# Patient Record
Sex: MALE | Race: AMERICAN INDIAN OR ALASKA NATIVE | NOT HISPANIC OR LATINO | URBAN - METROPOLITAN AREA
[De-identification: names, ages, dates, MRNs, and addresses within clinical notes are randomized per-mention and may not be internally consistent; named-entity substitution may affect disease eponyms.]

---

## 2017-10-03 ENCOUNTER — OUTPATIENT (OUTPATIENT)
Dept: OUTPATIENT SERVICES | Facility: HOSPITAL | Age: 37
LOS: 1 days | Discharge: HOME | End: 2017-10-03

## 2017-10-03 DIAGNOSIS — G47.33 OBSTRUCTIVE SLEEP APNEA (ADULT) (PEDIATRIC): ICD-10-CM

## 2017-10-03 DIAGNOSIS — F17.200 NICOTINE DEPENDENCE, UNSPECIFIED, UNCOMPLICATED: ICD-10-CM

## 2018-05-23 ENCOUNTER — EMERGENCY (EMERGENCY)
Facility: HOSPITAL | Age: 38
LOS: 0 days | Discharge: HOME | End: 2018-05-23
Admitting: PHYSICIAN ASSISTANT

## 2018-05-23 VITALS
HEART RATE: 104 BPM | DIASTOLIC BLOOD PRESSURE: 74 MMHG | TEMPERATURE: 100 F | RESPIRATION RATE: 20 BRPM | SYSTOLIC BLOOD PRESSURE: 118 MMHG | OXYGEN SATURATION: 99 %

## 2018-05-23 VITALS
DIASTOLIC BLOOD PRESSURE: 78 MMHG | OXYGEN SATURATION: 99 % | HEART RATE: 113 BPM | RESPIRATION RATE: 20 BRPM | TEMPERATURE: 100 F | SYSTOLIC BLOOD PRESSURE: 157 MMHG

## 2018-05-23 DIAGNOSIS — R50.9 FEVER, UNSPECIFIED: ICD-10-CM

## 2018-05-23 DIAGNOSIS — L02.31 CUTANEOUS ABSCESS OF BUTTOCK: ICD-10-CM

## 2018-05-23 RX ORDER — CEPHALEXIN 500 MG
1 CAPSULE ORAL
Qty: 28 | Refills: 0 | OUTPATIENT
Start: 2018-05-23 | End: 2018-05-29

## 2018-05-23 RX ORDER — CEPHALEXIN 500 MG
500 CAPSULE ORAL
Qty: 0 | Refills: 0 | Status: DISCONTINUED | OUTPATIENT
Start: 2018-05-23 | End: 2018-05-23

## 2018-05-23 RX ORDER — IBUPROFEN 200 MG
600 TABLET ORAL ONCE
Qty: 0 | Refills: 0 | Status: COMPLETED | OUTPATIENT
Start: 2018-05-23 | End: 2018-05-23

## 2018-05-23 RX ORDER — AZTREONAM 2 G
1 VIAL (EA) INJECTION
Qty: 14 | Refills: 0 | OUTPATIENT
Start: 2018-05-23 | End: 2018-05-29

## 2018-05-23 RX ADMIN — Medication 500 MILLIGRAM(S): at 22:02

## 2018-05-23 RX ADMIN — Medication 600 MILLIGRAM(S): at 21:40

## 2018-05-23 RX ADMIN — Medication 1 TABLET(S): at 22:02

## 2018-05-23 NOTE — ED PROVIDER NOTE - OBJECTIVE STATEMENT
36 yo male with no significant pmh presents to the ED c/o left buttock abscess x 2/3 days. Associated with fever and chills x 1 day. No prior abscess. No drainage. Pain worse with sitting. Denies abdominal pain, N/V/D.

## 2018-05-23 NOTE — ED PROVIDER NOTE - MEDICAL DECISION MAKING DETAILS
Patient with buttock abscess, I+D done with significant drainage, + low grade fever in ED. Patient started in abx and instructed to return in 1-2 days for wound check

## 2018-05-23 NOTE — ED PROVIDER NOTE - PHYSICAL EXAMINATION
GENERAL:  well appearing, non-toxic patient in no acute distress  SKIN: skin warm, pink and dry. MMM. + left sided buttock abscess, + central fluctuance with spontaneous purulent drainage, + surrounding erythema and increased warmth  PULM: CTAB. Normal respiratory effort. No respiratory distress. No wheezes, stridor, rales or rhonchi. No retractions  CV: RRR, no M/R/G.   ABD: Soft, non-tender, non-distended. No rebound or guarding. No CVA tenderness.  rectal: + left buttock abscess - see skin exam, no perirectal tenderness, no tenderness or fluctuance on digital exam,  NEURO: A+Ox3, no sensory/motor deficits

## 2018-05-23 NOTE — ED PROVIDER NOTE - NS ED ROS FT
Constitutional: +fever and chills  Cardiovascular: no chest pain, no sob  Respiratory: no cough, no shortness of breath  Gastrointestinal: no nausea, vomiting or diarrhea. no abdominal pain.  Integumentary: See HPI  Neurological: no headache, no dizziness, no visual changes, no UE/LE weakness or paresthesias.

## 2018-05-24 ENCOUNTER — EMERGENCY (EMERGENCY)
Facility: HOSPITAL | Age: 38
LOS: 0 days | Discharge: HOME | End: 2018-05-24
Admitting: PHYSICIAN ASSISTANT

## 2018-05-24 VITALS
RESPIRATION RATE: 18 BRPM | HEART RATE: 100 BPM | TEMPERATURE: 98 F | DIASTOLIC BLOOD PRESSURE: 70 MMHG | SYSTOLIC BLOOD PRESSURE: 142 MMHG | OXYGEN SATURATION: 99 %

## 2018-05-24 DIAGNOSIS — Z48.89 ENCOUNTER FOR OTHER SPECIFIED SURGICAL AFTERCARE: ICD-10-CM

## 2018-05-24 DIAGNOSIS — L02.31 CUTANEOUS ABSCESS OF BUTTOCK: ICD-10-CM

## 2018-05-24 DIAGNOSIS — Z79.2 LONG TERM (CURRENT) USE OF ANTIBIOTICS: ICD-10-CM

## 2018-05-24 RX ORDER — CEPHALEXIN 500 MG
1 CAPSULE ORAL
Qty: 28 | Refills: 0
Start: 2018-05-24 | End: 2018-05-30

## 2018-05-24 RX ORDER — AZTREONAM 2 G
1 VIAL (EA) INJECTION
Qty: 14 | Refills: 0
Start: 2018-05-24 | End: 2018-05-30

## 2018-05-24 NOTE — ED PROVIDER NOTE - OBJECTIVE STATEMENT
36 yo no sig hx present c/o wound check s/p I&D to left buttock abscess last night. patient was instructed to return in 24 hours. reports he had the abscess for about 3 days and I&D performed last night. pain improved after I&D. reports he is taking abx. denies fever/chill/worsening pain and swelling

## 2018-05-24 NOTE — ED PROVIDER NOTE - SKIN, MLM
a healing abscess noted to left buttock next to the anal region. no extension of swelling into the anus. no purulent drainage and surrounding erythema/swelling and tenderness.

## 2018-12-20 ENCOUNTER — EMERGENCY (EMERGENCY)
Facility: HOSPITAL | Age: 38
LOS: 0 days | Discharge: HOME | End: 2018-12-21
Attending: EMERGENCY MEDICINE | Admitting: EMERGENCY MEDICINE

## 2018-12-20 VITALS
DIASTOLIC BLOOD PRESSURE: 80 MMHG | OXYGEN SATURATION: 99 % | RESPIRATION RATE: 20 BRPM | HEART RATE: 63 BPM | TEMPERATURE: 98 F | SYSTOLIC BLOOD PRESSURE: 133 MMHG

## 2018-12-20 DIAGNOSIS — E78.5 HYPERLIPIDEMIA, UNSPECIFIED: ICD-10-CM

## 2018-12-20 DIAGNOSIS — R07.9 CHEST PAIN, UNSPECIFIED: ICD-10-CM

## 2018-12-20 DIAGNOSIS — F17.210 NICOTINE DEPENDENCE, CIGARETTES, UNCOMPLICATED: ICD-10-CM

## 2018-12-20 LAB
ALBUMIN SERPL ELPH-MCNC: 4.8 G/DL — SIGNIFICANT CHANGE UP (ref 3.5–5.2)
ALP SERPL-CCNC: 78 U/L — SIGNIFICANT CHANGE UP (ref 30–115)
ALT FLD-CCNC: 50 U/L — HIGH (ref 0–41)
ANION GAP SERPL CALC-SCNC: 16 MMOL/L — HIGH (ref 7–14)
AST SERPL-CCNC: 21 U/L — SIGNIFICANT CHANGE UP (ref 0–41)
BILIRUB SERPL-MCNC: 0.3 MG/DL — SIGNIFICANT CHANGE UP (ref 0.2–1.2)
BUN SERPL-MCNC: 12 MG/DL — SIGNIFICANT CHANGE UP (ref 10–20)
CALCIUM SERPL-MCNC: 9.3 MG/DL — SIGNIFICANT CHANGE UP (ref 8.5–10.1)
CHLORIDE SERPL-SCNC: 99 MMOL/L — SIGNIFICANT CHANGE UP (ref 98–110)
CO2 SERPL-SCNC: 25 MMOL/L — SIGNIFICANT CHANGE UP (ref 17–32)
CREAT SERPL-MCNC: 1 MG/DL — SIGNIFICANT CHANGE UP (ref 0.7–1.5)
GLUCOSE SERPL-MCNC: 90 MG/DL — SIGNIFICANT CHANGE UP (ref 70–99)
HCT VFR BLD CALC: 47.1 % — SIGNIFICANT CHANGE UP (ref 42–52)
HGB BLD-MCNC: 16.4 G/DL — SIGNIFICANT CHANGE UP (ref 14–18)
MCHC RBC-ENTMCNC: 31.1 PG — HIGH (ref 27–31)
MCHC RBC-ENTMCNC: 34.8 G/DL — SIGNIFICANT CHANGE UP (ref 32–37)
MCV RBC AUTO: 89.4 FL — SIGNIFICANT CHANGE UP (ref 80–94)
NRBC # BLD: 0 /100 WBCS — SIGNIFICANT CHANGE UP (ref 0–0)
PLATELET # BLD AUTO: 289 K/UL — SIGNIFICANT CHANGE UP (ref 130–400)
POTASSIUM SERPL-MCNC: 3.9 MMOL/L — SIGNIFICANT CHANGE UP (ref 3.5–5)
POTASSIUM SERPL-SCNC: 3.9 MMOL/L — SIGNIFICANT CHANGE UP (ref 3.5–5)
PROT SERPL-MCNC: 7.1 G/DL — SIGNIFICANT CHANGE UP (ref 6–8)
RBC # BLD: 5.27 M/UL — SIGNIFICANT CHANGE UP (ref 4.7–6.1)
RBC # FLD: 11.8 % — SIGNIFICANT CHANGE UP (ref 11.5–14.5)
SODIUM SERPL-SCNC: 140 MMOL/L — SIGNIFICANT CHANGE UP (ref 135–146)
TROPONIN T SERPL-MCNC: <0.01 NG/ML — SIGNIFICANT CHANGE UP
WBC # BLD: 11.22 K/UL — HIGH (ref 4.8–10.8)
WBC # FLD AUTO: 11.22 K/UL — HIGH (ref 4.8–10.8)

## 2018-12-20 NOTE — ED PROVIDER NOTE - NS ED ROS FT
Constitutional: No fever, chills.  Eyes: No visual changes.  ENT: No hearing changes.  Neck: No neck pain or stiffness.  Cardiovascular: see hpi.  Pulmonary: No SOB, cough. No hemoptysis.  Abdominal:  No nausea, vomiting, diarrhea.  : No dysuria, frequency.  Neuro: No headache, syncope, dizziness.  MS: No back pain. No calf pain/swelling.  Psych: No suicidal ideations.

## 2018-12-20 NOTE — ED PROVIDER NOTE - ATTENDING CONTRIBUTION TO CARE
38 y.o. male comes in c/o chest pain for 4 days, intermittent, sometimes worse with movement. No SOB. No fever/chills, n/v/c/d, diaphoresis, leg pain/swelling. No travel. No family h/o heart dz. Went to [facility] today, was found to have TWI in inferior leads and was sent to ED. On exam, pt in NAD, AAOx3, head NC/AT, CN II-XII intact, lungs CTA B/L, chest (-) pain on palpation, CV S1S2 regular, abdomen soft/NT/ND/(+)BS, ext (-) edema, motor 5/5x4, sensation intact. Will do labs, CXR, EKG and reevaluate.

## 2018-12-20 NOTE — ED PROVIDER NOTE - OBJECTIVE STATEMENT
38 yold male to Ed Pmhx Hld currently on no meds c/o chest pain described as sharp mid sternal worse with sitting forward x 4 days; pt taking tylenol and naprosen for pain; + smoker; denies crack, cocaine, fmhx cad, DM,

## 2018-12-20 NOTE — ED ADULT NURSE NOTE - OBJECTIVE STATEMENT
Pt BIBA c/o mid-sternal chest pain non-radiating x 4 days, sent in by PCP for ST depressions. Denies n/v/d, denies fevers/chills.

## 2018-12-20 NOTE — ED ADULT NURSE NOTE - NSIMPLEMENTINTERV_GEN_ALL_ED
Implemented All Universal Safety Interventions:  [place] to call system. Call bell, personal items and telephone within reach. Instruct patient to call for assistance. Room bathroom lighting operational. Non-slip footwear when patient is off stretcher. Physically safe environment: no spills, clutter or unnecessary equipment. Stretcher in lowest position, wheels locked, appropriate side rails in place.

## 2018-12-20 NOTE — ED PROVIDER NOTE - MEDICAL DECISION MAKING DETAILS
38 y.o. male comes in c/o chest pain for 4 days, intermittent, sometimes worse with movement. No SOB. No fever/chills, n/v/c/d, diaphoresis, leg pain/swelling. No travel. No family h/o heart dz. Went to [facility] [place] [place] today, was found to have TWI in inferior leads and was sent to ED. On exam, pt in NAD, AAOx3, head NC/AT, CN II-XII intact, lungs CTA B/L, chest (-) pain on palpation, CV S1S2 regular, abdomen soft/NT/ND/(+)BS, ext (-) edema, motor 5/5x4, sensation intact. Will transfer pt to obs.

## 2018-12-20 NOTE — ED PROVIDER NOTE - PHYSICAL EXAMINATION
Constitutional: Well developed, well nourished. NAD  Head: Normocephalic, atraumatic.  Eyes: PERRL, EOMI.  ENT: No nasal discharge. Mucous membranes dry.  Neck: Supple. Painless ROM.  Cardiovascular:  Regular rate and rhythm.  Pulmonary:  Lungs clear to auscultation bilaterally.   Abdominal: Soft. Nondistended. No rebound, guarding, rigidity.  Extremities. Pelvis stable. No lower extremity edema, symmetric calves.  Skin: No rashes, cyanosis.  Neuro: AAOx3. No focal neurological deficits.  Psych: Normal mood. Normal affect.

## 2018-12-21 VITALS
OXYGEN SATURATION: 99 % | DIASTOLIC BLOOD PRESSURE: 60 MMHG | RESPIRATION RATE: 16 BRPM | HEART RATE: 76 BPM | SYSTOLIC BLOOD PRESSURE: 127 MMHG

## 2018-12-21 LAB — TROPONIN T SERPL-MCNC: <0.01 NG/ML — SIGNIFICANT CHANGE UP

## 2018-12-21 RX ORDER — KETOROLAC TROMETHAMINE 30 MG/ML
15 SYRINGE (ML) INJECTION ONCE
Qty: 0 | Refills: 0 | Status: DISCONTINUED | OUTPATIENT
Start: 2018-12-21 | End: 2018-12-21

## 2018-12-21 RX ADMIN — Medication 15 MILLIGRAM(S): at 12:11

## 2018-12-21 RX ADMIN — Medication 15 MILLIGRAM(S): at 09:46

## 2018-12-21 NOTE — ED CDU PROVIDER DISPOSITION NOTE - CARE PROVIDER_API CALL
Raimundo Colbert), Cardiovascular Disease; Internal Medicine  [address] [place] [address]  Phone: [phone]  Fax: [phone]

## 2018-12-21 NOTE — ED ADULT NURSE REASSESSMENT NOTE - NS ED NURSE REASSESS COMMENT FT1
Patient report received from previous RN, patient at this time is at ECHO for further studies, will wait for patient to return to ED for assessment.

## 2018-12-21 NOTE — ED CDU PROVIDER DISPOSITION NOTE - ATTENDING CONTRIBUTION TO CARE
39yo smoker with h/o HLD was placed in CDU for workup of chest pain, positional, worse with movement, deep breaths, constant x several days. No fever/chills. Pt was eval in the ED with EKG, which was suggestive of pericarditis. Pt was monitored in CDU without incident and remained hemodynamically stable. Pain persisted but improved dramatically with NSAIDS. On my eval pt nontoxic appearing, lungs CTA, CVS1S2 RRR abd soft, no peripheral edema. Pain is reproducible with coughing, and moving in bed. Repeat EKG and enzymes unchanged. Echo was normal, with no pericardial effusion. Doubt CAD, much more likely viral pericarditis; rec NSAIDS, f/u with cardiology and return to the ED for persistent or worsening sx.

## 2018-12-21 NOTE — ED CDU PROVIDER DISPOSITION NOTE - CLINICAL COURSE
37yo smoker with h/o HLD was placed in CDU for workup of chest pain, positional, worse with movement, deep breaths, constant x several days. No fever/chills. Pt was eval in the ED with EKG, which was suggestive of pericarditis. Pt was monitored in CDU without incident and remained hemodynamically stable. Pain persisted but improved dramatically with NSAIDS. On my eval pt nontoxic appearing, lungs CTA, CVS1S2 RRR abd soft, no peripheral edema. Pain is reproducible with coughing, and moving in bed. Repeat EKG and enzymes unchanged. Echo was normal, with no pericardial effusion. Doubt CAD, much more likely viral pericarditis; rec NSAIDS, f/u with cardiology and return to the ED for persistent or worsening sx.

## 2018-12-21 NOTE — ED CDU PROVIDER INITIAL DAY NOTE - PROGRESS NOTE DETAILS
Pt seen bedside NAD, resting comfortably, pt still in pain given toradol. PT went for echo and waiting on pending results, had negative cardiac enzymes x2 and slightly abnormal ekg.

## 2018-12-21 NOTE — ED CDU PROVIDER INITIAL DAY NOTE - MEDICAL DECISION MAKING DETAILS
39yo smoker with h/o HLD was placed in CDU for workup of chest pain after unremarkable ED eval. VS, exam as noted, pt comfortable on my evaluation. Plan for serial enzymes, EKG, CCTA. 39yo smoker with h/o HLD was placed in CDU for workup of chest pain, positional, worse with movement, deep breaths, constant x several days. No fever/chills. Pt was eval in the ED with EKG, which was suggestive of pericarditis, and labs with cardiac enzymes which were normal. Plan is for echo, serial enzymes, pain control, reassess.

## 2018-12-21 NOTE — ED ADULT NURSE REASSESSMENT NOTE - NS ED NURSE REASSESS COMMENT FT1
Patient returned to ED from echo, patient is alert and awake and oriented x4, no acute distress expressed from patient, currently NSR, VSS, will continue to watch and assess patient, safety and comfort measures maintained.

## 2018-12-21 NOTE — ED CDU PROVIDER INITIAL DAY NOTE - CARDIAC, MLM
Normal rate, regular rhythm.  Heart sounds S1, S2.  No murmurs, rubs or gallops. + ttp over sternal area and right chest wall.

## 2018-12-21 NOTE — ED CDU PROVIDER INITIAL DAY NOTE - OBJECTIVE STATEMENT
37y/o male with pmh of hld-pt. states during last check up his bad cholesterol was elevated but his doctor never put him on hld meds, pt. c/o diffuse cp x 4 day6s. cp is positional. denies heavy lifting, sob, fever, cough, abdominal pain, vomiting. + smoker, no drug use. no family hx of cad.

## 2019-11-07 ENCOUNTER — EMERGENCY (EMERGENCY)
Facility: HOSPITAL | Age: 39
LOS: 0 days | Discharge: HOME | End: 2019-11-07
Attending: EMERGENCY MEDICINE | Admitting: EMERGENCY MEDICINE
Payer: MEDICAID

## 2019-11-07 VITALS
OXYGEN SATURATION: 99 % | HEART RATE: 78 BPM | DIASTOLIC BLOOD PRESSURE: 71 MMHG | RESPIRATION RATE: 20 BRPM | TEMPERATURE: 97 F | SYSTOLIC BLOOD PRESSURE: 138 MMHG

## 2019-11-07 VITALS
OXYGEN SATURATION: 99 % | RESPIRATION RATE: 18 BRPM | SYSTOLIC BLOOD PRESSURE: 147 MMHG | DIASTOLIC BLOOD PRESSURE: 79 MMHG | HEART RATE: 79 BPM | TEMPERATURE: 96 F

## 2019-11-07 DIAGNOSIS — R10.9 UNSPECIFIED ABDOMINAL PAIN: ICD-10-CM

## 2019-11-07 DIAGNOSIS — N20.0 CALCULUS OF KIDNEY: ICD-10-CM

## 2019-11-07 PROBLEM — E78.00 PURE HYPERCHOLESTEROLEMIA, UNSPECIFIED: Chronic | Status: ACTIVE | Noted: 2018-12-20

## 2019-11-07 LAB
ALBUMIN SERPL ELPH-MCNC: 4.6 G/DL — SIGNIFICANT CHANGE UP (ref 3.5–5.2)
ALP SERPL-CCNC: 95 U/L — SIGNIFICANT CHANGE UP (ref 30–115)
ALT FLD-CCNC: 43 U/L — HIGH (ref 0–41)
ANION GAP SERPL CALC-SCNC: 13 MMOL/L — SIGNIFICANT CHANGE UP (ref 7–14)
APPEARANCE UR: CLEAR — SIGNIFICANT CHANGE UP
APTT BLD: 31.3 SEC — SIGNIFICANT CHANGE UP (ref 27–39.2)
AST SERPL-CCNC: 21 U/L — SIGNIFICANT CHANGE UP (ref 0–41)
BACTERIA # UR AUTO: NEGATIVE — SIGNIFICANT CHANGE UP
BASOPHILS # BLD AUTO: 0.05 K/UL — SIGNIFICANT CHANGE UP (ref 0–0.2)
BASOPHILS NFR BLD AUTO: 0.4 % — SIGNIFICANT CHANGE UP (ref 0–1)
BILIRUB SERPL-MCNC: 0.3 MG/DL — SIGNIFICANT CHANGE UP (ref 0.2–1.2)
BILIRUB UR-MCNC: NEGATIVE — SIGNIFICANT CHANGE UP
BLD GP AB SCN SERPL QL: SIGNIFICANT CHANGE UP
BUN SERPL-MCNC: 15 MG/DL — SIGNIFICANT CHANGE UP (ref 10–20)
CALCIUM SERPL-MCNC: 9.2 MG/DL — SIGNIFICANT CHANGE UP (ref 8.5–10.1)
CHLORIDE SERPL-SCNC: 103 MMOL/L — SIGNIFICANT CHANGE UP (ref 98–110)
CO2 SERPL-SCNC: 27 MMOL/L — SIGNIFICANT CHANGE UP (ref 17–32)
COLOR SPEC: SIGNIFICANT CHANGE UP
CREAT SERPL-MCNC: 1.2 MG/DL — SIGNIFICANT CHANGE UP (ref 0.7–1.5)
DIFF PNL FLD: ABNORMAL
EOSINOPHIL # BLD AUTO: 0.2 K/UL — SIGNIFICANT CHANGE UP (ref 0–0.7)
EOSINOPHIL NFR BLD AUTO: 1.6 % — SIGNIFICANT CHANGE UP (ref 0–8)
EPI CELLS # UR: 1 /HPF — SIGNIFICANT CHANGE UP (ref 0–5)
GLUCOSE SERPL-MCNC: 126 MG/DL — HIGH (ref 70–99)
GLUCOSE UR QL: NEGATIVE — SIGNIFICANT CHANGE UP
HCT VFR BLD CALC: 44.6 % — SIGNIFICANT CHANGE UP (ref 42–52)
HGB BLD-MCNC: 15.1 G/DL — SIGNIFICANT CHANGE UP (ref 14–18)
HYALINE CASTS # UR AUTO: 2 /LPF — SIGNIFICANT CHANGE UP (ref 0–7)
IMM GRANULOCYTES NFR BLD AUTO: 0.4 % — HIGH (ref 0.1–0.3)
INR BLD: 0.98 RATIO — SIGNIFICANT CHANGE UP (ref 0.65–1.3)
KETONES UR-MCNC: NEGATIVE — SIGNIFICANT CHANGE UP
LACTATE SERPL-SCNC: 1.6 MMOL/L — SIGNIFICANT CHANGE UP (ref 0.5–2.2)
LEUKOCYTE ESTERASE UR-ACNC: NEGATIVE — SIGNIFICANT CHANGE UP
LIDOCAIN IGE QN: 63 U/L — HIGH (ref 7–60)
LYMPHOCYTES # BLD AUTO: 19.6 % — LOW (ref 20.5–51.1)
LYMPHOCYTES # BLD AUTO: 2.52 K/UL — SIGNIFICANT CHANGE UP (ref 1.2–3.4)
MCHC RBC-ENTMCNC: 30.9 PG — SIGNIFICANT CHANGE UP (ref 27–31)
MCHC RBC-ENTMCNC: 33.9 G/DL — SIGNIFICANT CHANGE UP (ref 32–37)
MCV RBC AUTO: 91.2 FL — SIGNIFICANT CHANGE UP (ref 80–94)
MONOCYTES # BLD AUTO: 0.73 K/UL — HIGH (ref 0.1–0.6)
MONOCYTES NFR BLD AUTO: 5.7 % — SIGNIFICANT CHANGE UP (ref 1.7–9.3)
NEUTROPHILS # BLD AUTO: 9.29 K/UL — HIGH (ref 1.4–6.5)
NEUTROPHILS NFR BLD AUTO: 72.3 % — SIGNIFICANT CHANGE UP (ref 42.2–75.2)
NITRITE UR-MCNC: NEGATIVE — SIGNIFICANT CHANGE UP
NRBC # BLD: 0 /100 WBCS — SIGNIFICANT CHANGE UP (ref 0–0)
PH UR: 6.5 — SIGNIFICANT CHANGE UP (ref 5–8)
PLATELET # BLD AUTO: 227 K/UL — SIGNIFICANT CHANGE UP (ref 130–400)
POTASSIUM SERPL-MCNC: 4.1 MMOL/L — SIGNIFICANT CHANGE UP (ref 3.5–5)
POTASSIUM SERPL-SCNC: 4.1 MMOL/L — SIGNIFICANT CHANGE UP (ref 3.5–5)
PROT SERPL-MCNC: 6.9 G/DL — SIGNIFICANT CHANGE UP (ref 6–8)
PROT UR-MCNC: SIGNIFICANT CHANGE UP
PROTHROM AB SERPL-ACNC: 11.3 SEC — SIGNIFICANT CHANGE UP (ref 9.95–12.87)
RBC # BLD: 4.89 M/UL — SIGNIFICANT CHANGE UP (ref 4.7–6.1)
RBC # FLD: 11.8 % — SIGNIFICANT CHANGE UP (ref 11.5–14.5)
RBC CASTS # UR COMP ASSIST: 30 /HPF — HIGH (ref 0–4)
SODIUM SERPL-SCNC: 143 MMOL/L — SIGNIFICANT CHANGE UP (ref 135–146)
SP GR SPEC: >1.05 (ref 1.01–1.02)
UROBILINOGEN FLD QL: SIGNIFICANT CHANGE UP
WBC # BLD: 12.84 K/UL — HIGH (ref 4.8–10.8)
WBC # FLD AUTO: 12.84 K/UL — HIGH (ref 4.8–10.8)
WBC UR QL: 6 /HPF — HIGH (ref 0–5)

## 2019-11-07 PROCEDURE — 99284 EMERGENCY DEPT VISIT MOD MDM: CPT

## 2019-11-07 PROCEDURE — 74177 CT ABD & PELVIS W/CONTRAST: CPT | Mod: 26

## 2019-11-07 RX ORDER — SODIUM CHLORIDE 9 MG/ML
1000 INJECTION INTRAMUSCULAR; INTRAVENOUS; SUBCUTANEOUS ONCE
Refills: 0 | Status: COMPLETED | OUTPATIENT
Start: 2019-11-07 | End: 2019-11-07

## 2019-11-07 RX ORDER — KETOROLAC TROMETHAMINE 30 MG/ML
1 SYRINGE (ML) INJECTION
Qty: 15 | Refills: 0
Start: 2019-11-07 | End: 2019-11-11

## 2019-11-07 RX ORDER — KETOROLAC TROMETHAMINE 30 MG/ML
15 SYRINGE (ML) INJECTION ONCE
Refills: 0 | Status: DISCONTINUED | OUTPATIENT
Start: 2019-11-07 | End: 2019-11-07

## 2019-11-07 RX ORDER — TAMSULOSIN HYDROCHLORIDE 0.4 MG/1
1 CAPSULE ORAL
Qty: 5 | Refills: 0
Start: 2019-11-07 | End: 2019-11-11

## 2019-11-07 RX ADMIN — Medication 15 MILLIGRAM(S): at 13:03

## 2019-11-07 RX ADMIN — SODIUM CHLORIDE 1000 MILLILITER(S): 9 INJECTION INTRAMUSCULAR; INTRAVENOUS; SUBCUTANEOUS at 11:56

## 2019-11-07 NOTE — ED PROVIDER NOTE - PHYSICAL EXAMINATION
CONST: Well appearing in NAD  EYES: Sclera and conjunctiva clear.  CARD: Normal S1 S2; Normal rate and rhythm  RESP: Equal BS B/L, No wheezes, rhonchi or rales. No distress  GI: Soft, + TTP RLQ, no rebound or guarding, no CVA tenderness, non-distended.  : no testicular tenderness, normal lie, normal cremasteric reflex.   MS: Normal ROM in all extremities. No edema of lower extremities, no calf pain, radial pulses 2+ bilaterally  SKIN: Warm, dry, no acute rashes. Good turgor  NEURO: A&Ox3, No focal deficits. Strength 5/5 with no sensory deficits. Steady gait

## 2019-11-07 NOTE — ED ADULT NURSE NOTE - OBJECTIVE STATEMENT
[place] [facility] [place] pain. Pt reports pain started early this AM and increased in severity throughout the morning. Pt denies N/V/D/Fever. No s/s distress observed.

## 2019-11-07 NOTE — ED PROVIDER NOTE - CLINICAL SUMMARY MEDICAL DECISION MAKING FREE TEXT BOX
38yo M presenting with abdominal pain x2d. Initially diffuse now RLQ. No f/c/n/v/d, chest pain, shortness of breath, dysuria/hematuria, back pain, numbness/focal weakness, hx abdominal surgeries. labs imaging reviewed. Pt feeling improved, tolerating PO, abdomen soft, non-tender, non-distended, no rebound, no guarding. Aware of all results, given a copy of all available results, comfortable with discharge and follow-up outpatient, strict return precautions given. Endorses understanding of all of this and aware that they can return at any time for new or concerning symptoms. No further questions or concerns at this time

## 2019-11-07 NOTE — ED PROVIDER NOTE - PATIENT PORTAL LINK FT
You can access the FollowMyHealth Patient Portal offered by [facility] by registering at the following website: [email or web address]. By joining [facility]’s FollowMyHealth portal, you will also be able to view your health information using other applications (apps) compatible with our system.

## 2019-11-07 NOTE — ED ADULT NURSE NOTE - CHIEF COMPLAINT QUOTE
Pt presenting with RLQ pain - was sent from [facility] [place] for further evaluation

## 2019-11-07 NOTE — ED PROVIDER NOTE - OBJECTIVE STATEMENT
39 y.o male w/ no sig pmhx presents to the ED for evaluation of abd pain since last night.  States he developed gradual onset RLQ abd pain last night, constant, progressively worse, exacerbated with movement, no alleviating factors, associated with nausea. Went to UC, received toradol 30 IM and felt improvement of pain.  at this time asx.  Denies diarrhea, constipation, urinary sxs, fever, chills, back pain, vomiting.

## 2019-11-07 NOTE — ED ADULT NURSE NOTE - NSIMPLEMENTINTERV_GEN_ALL_ED
Implemented All Universal Safety Interventions:  [place] to call system. Call bell, personal items and telephone within reach. Instruct patient to call for assistance. Room bathroom lighting operational. Non-slip footwear when patient is off stretcher. Physically safe environment: no spills, clutter or unnecessary equipment. Stretcher in lowest position, wheels locked, appropriate side rails in place.

## 2019-11-07 NOTE — ED PROVIDER NOTE - NS ED ROS FT
Constitutional: See HPI.  Eyes: No visual changes, eye pain or discharge.   ENMT: No hearing changes, pain, discharge or infections.   Cardiac: No SOB or edema. No chest pain with exertion.  Respiratory: No cough or respiratory distress.   GI: + abd pain, + nausea. No vomiting, diarrhea  : No dysuria, frequency or burning. No Discharge, no testicular pain   MS: No myalgia, muscle weakness, joint pain or back pain.  Neuro: No headache or weakness. No LOC.  Skin: No skin rash.  Except as documented in the HPI, all other systems are negative.

## 2019-11-07 NOTE — ED PROVIDER NOTE - PROGRESS NOTE DETAILS
Discussed results with pt.  All questions were answered and return precautions discussed.  Pt is asx and comfortable at this time.  Unremarkable re-exam.  No further concerns at this time from pt.  Will follow up with PMD and urology.  Pt understands and agrees with tx plan.  tolerating po in the ED

## 2019-11-07 NOTE — ED PROVIDER NOTE - ATTENDING CONTRIBUTION TO CARE
40yo M presenting with abdominal pain x2d. Initially diffuse now RLQ. No f/c/n/v/d, chest pain, shortness of breath, dysuria/hematuria, back pain, numbness/focal weakness, hx abdominal surgeries   Constitutional: Well appearing. No acute distress. Non toxic.   Eyes: PERRLA. Extraocular movements intact, no entrapment. Conjunctiva normal.   ENT: No nasal discharge. Moist mucus membranes.  Neck: Supple, non tender, full range of motion.  CV: RRR no murmurs, rubs, or gallops. +S1S2.   Pulm: Clear to auscultation bilaterally. Normal work of breathing.  Abd: soft +periumbilical and RLQ ttp no rebound no guarding ND +BS.    exam as above  Ext: Warm and well perfused x4, moving all extremities, no edema.   Psy: Cooperative, appropriate.   Skin: Warm, dry, no rash  Neuro: CN2-12 grossly intact no sensory or motor deficits throughout, no drift, no ataxia,

## 2019-11-09 LAB
CULTURE RESULTS: SIGNIFICANT CHANGE UP
SPECIMEN SOURCE: SIGNIFICANT CHANGE UP

## 2020-02-27 ENCOUNTER — EMERGENCY (EMERGENCY)
Facility: HOSPITAL | Age: 40
LOS: 0 days | Discharge: HOME | End: 2020-02-27
Attending: EMERGENCY MEDICINE | Admitting: EMERGENCY MEDICINE
Payer: COMMERCIAL

## 2020-02-27 VITALS
WEIGHT: 199.96 LBS | OXYGEN SATURATION: 97 % | RESPIRATION RATE: 18 BRPM | SYSTOLIC BLOOD PRESSURE: 148 MMHG | TEMPERATURE: 98 F | HEART RATE: 85 BPM | DIASTOLIC BLOOD PRESSURE: 70 MMHG

## 2020-02-27 DIAGNOSIS — R10.9 UNSPECIFIED ABDOMINAL PAIN: ICD-10-CM

## 2020-02-27 DIAGNOSIS — N13.2 HYDRONEPHROSIS WITH RENAL AND URETERAL CALCULOUS OBSTRUCTION: ICD-10-CM

## 2020-02-27 LAB
ALBUMIN SERPL ELPH-MCNC: 4.7 G/DL — SIGNIFICANT CHANGE UP (ref 3.5–5.2)
ALP SERPL-CCNC: 87 U/L — SIGNIFICANT CHANGE UP (ref 30–115)
ALT FLD-CCNC: 43 U/L — HIGH (ref 0–41)
ANION GAP SERPL CALC-SCNC: 13 MMOL/L — SIGNIFICANT CHANGE UP (ref 7–14)
APPEARANCE UR: CLEAR — SIGNIFICANT CHANGE UP
APTT BLD: 33.7 SEC — SIGNIFICANT CHANGE UP (ref 27–39.2)
AST SERPL-CCNC: 21 U/L — SIGNIFICANT CHANGE UP (ref 0–41)
BACTERIA # UR AUTO: NEGATIVE — SIGNIFICANT CHANGE UP
BASOPHILS # BLD AUTO: 0.05 K/UL — SIGNIFICANT CHANGE UP (ref 0–0.2)
BASOPHILS NFR BLD AUTO: 0.4 % — SIGNIFICANT CHANGE UP (ref 0–1)
BILIRUB SERPL-MCNC: 0.4 MG/DL — SIGNIFICANT CHANGE UP (ref 0.2–1.2)
BILIRUB UR-MCNC: NEGATIVE — SIGNIFICANT CHANGE UP
BUN SERPL-MCNC: 23 MG/DL — HIGH (ref 10–20)
CALCIUM SERPL-MCNC: 9.4 MG/DL — SIGNIFICANT CHANGE UP (ref 8.5–10.1)
CHLORIDE SERPL-SCNC: 100 MMOL/L — SIGNIFICANT CHANGE UP (ref 98–110)
CO2 SERPL-SCNC: 26 MMOL/L — SIGNIFICANT CHANGE UP (ref 17–32)
COLOR SPEC: SIGNIFICANT CHANGE UP
CREAT SERPL-MCNC: 1.6 MG/DL — HIGH (ref 0.7–1.5)
DIFF PNL FLD: ABNORMAL
EOSINOPHIL # BLD AUTO: 0.15 K/UL — SIGNIFICANT CHANGE UP (ref 0–0.7)
EOSINOPHIL NFR BLD AUTO: 1.2 % — SIGNIFICANT CHANGE UP (ref 0–8)
EPI CELLS # UR: 0 /HPF — SIGNIFICANT CHANGE UP (ref 0–5)
GLUCOSE SERPL-MCNC: 139 MG/DL — HIGH (ref 70–99)
GLUCOSE UR QL: SIGNIFICANT CHANGE UP
HCT VFR BLD CALC: 45.3 % — SIGNIFICANT CHANGE UP (ref 42–52)
HGB BLD-MCNC: 15.9 G/DL — SIGNIFICANT CHANGE UP (ref 14–18)
HYALINE CASTS # UR AUTO: 0 /LPF — SIGNIFICANT CHANGE UP (ref 0–7)
IMM GRANULOCYTES NFR BLD AUTO: 0.4 % — HIGH (ref 0.1–0.3)
INR BLD: 0.99 RATIO — SIGNIFICANT CHANGE UP (ref 0.65–1.3)
KETONES UR-MCNC: NEGATIVE — SIGNIFICANT CHANGE UP
LACTATE SERPL-SCNC: 1.9 MMOL/L — SIGNIFICANT CHANGE UP (ref 0.7–2)
LEUKOCYTE ESTERASE UR-ACNC: NEGATIVE — SIGNIFICANT CHANGE UP
LIDOCAIN IGE QN: 68 U/L — HIGH (ref 7–60)
LYMPHOCYTES # BLD AUTO: 1.6 K/UL — SIGNIFICANT CHANGE UP (ref 1.2–3.4)
LYMPHOCYTES # BLD AUTO: 12.6 % — LOW (ref 20.5–51.1)
MCHC RBC-ENTMCNC: 32.1 PG — HIGH (ref 27–31)
MCHC RBC-ENTMCNC: 35.1 G/DL — SIGNIFICANT CHANGE UP (ref 32–37)
MCV RBC AUTO: 91.3 FL — SIGNIFICANT CHANGE UP (ref 80–94)
MONOCYTES # BLD AUTO: 0.68 K/UL — HIGH (ref 0.1–0.6)
MONOCYTES NFR BLD AUTO: 5.4 % — SIGNIFICANT CHANGE UP (ref 1.7–9.3)
NEUTROPHILS # BLD AUTO: 10.12 K/UL — HIGH (ref 1.4–6.5)
NEUTROPHILS NFR BLD AUTO: 80 % — HIGH (ref 42.2–75.2)
NITRITE UR-MCNC: NEGATIVE — SIGNIFICANT CHANGE UP
NRBC # BLD: 0 /100 WBCS — SIGNIFICANT CHANGE UP (ref 0–0)
PH UR: 6 — SIGNIFICANT CHANGE UP (ref 5–8)
PLATELET # BLD AUTO: 257 K/UL — SIGNIFICANT CHANGE UP (ref 130–400)
POTASSIUM SERPL-MCNC: 4.2 MMOL/L — SIGNIFICANT CHANGE UP (ref 3.5–5)
POTASSIUM SERPL-SCNC: 4.2 MMOL/L — SIGNIFICANT CHANGE UP (ref 3.5–5)
PROT SERPL-MCNC: 7.4 G/DL — SIGNIFICANT CHANGE UP (ref 6–8)
PROT UR-MCNC: NEGATIVE — SIGNIFICANT CHANGE UP
PROTHROM AB SERPL-ACNC: 11.4 SEC — SIGNIFICANT CHANGE UP (ref 9.95–12.87)
RBC # BLD: 4.96 M/UL — SIGNIFICANT CHANGE UP (ref 4.7–6.1)
RBC # FLD: 11.7 % — SIGNIFICANT CHANGE UP (ref 11.5–14.5)
RBC CASTS # UR COMP ASSIST: 4 /HPF — SIGNIFICANT CHANGE UP (ref 0–4)
SODIUM SERPL-SCNC: 139 MMOL/L — SIGNIFICANT CHANGE UP (ref 135–146)
SP GR SPEC: 1.02 — SIGNIFICANT CHANGE UP (ref 1.01–1.02)
UROBILINOGEN FLD QL: SIGNIFICANT CHANGE UP
WBC # BLD: 12.65 K/UL — HIGH (ref 4.8–10.8)
WBC # FLD AUTO: 12.65 K/UL — HIGH (ref 4.8–10.8)
WBC UR QL: 2 /HPF — SIGNIFICANT CHANGE UP (ref 0–5)

## 2020-02-27 PROCEDURE — 74177 CT ABD & PELVIS W/CONTRAST: CPT | Mod: 26

## 2020-02-27 PROCEDURE — 99285 EMERGENCY DEPT VISIT HI MDM: CPT

## 2020-02-27 RX ORDER — MORPHINE SULFATE 50 MG/1
4 CAPSULE, EXTENDED RELEASE ORAL ONCE
Refills: 0 | Status: DISCONTINUED | OUTPATIENT
Start: 2020-02-27 | End: 2020-02-27

## 2020-02-27 RX ORDER — ACETAMINOPHEN 500 MG
650 TABLET ORAL ONCE
Refills: 0 | Status: DISCONTINUED | OUTPATIENT
Start: 2020-02-27 | End: 2020-02-27

## 2020-02-27 RX ORDER — SODIUM CHLORIDE 9 MG/ML
2000 INJECTION, SOLUTION INTRAVENOUS ONCE
Refills: 0 | Status: COMPLETED | OUTPATIENT
Start: 2020-02-27 | End: 2020-02-27

## 2020-02-27 RX ORDER — SODIUM CHLORIDE 9 MG/ML
1000 INJECTION, SOLUTION INTRAVENOUS ONCE
Refills: 0 | Status: DISCONTINUED | OUTPATIENT
Start: 2020-02-27 | End: 2020-02-27

## 2020-02-27 RX ORDER — KETOROLAC TROMETHAMINE 30 MG/ML
1 SYRINGE (ML) INJECTION
Qty: 12 | Refills: 0
Start: 2020-02-27 | End: 2020-02-29

## 2020-02-27 RX ORDER — ONDANSETRON 8 MG/1
4 TABLET, FILM COATED ORAL ONCE
Refills: 0 | Status: COMPLETED | OUTPATIENT
Start: 2020-02-27 | End: 2020-02-27

## 2020-02-27 RX ORDER — TAMSULOSIN HYDROCHLORIDE 0.4 MG/1
0.4 CAPSULE ORAL ONCE
Refills: 0 | Status: COMPLETED | OUTPATIENT
Start: 2020-02-27 | End: 2020-02-27

## 2020-02-27 RX ORDER — KETOROLAC TROMETHAMINE 30 MG/ML
15 SYRINGE (ML) INJECTION ONCE
Refills: 0 | Status: DISCONTINUED | OUTPATIENT
Start: 2020-02-27 | End: 2020-02-27

## 2020-02-27 RX ORDER — TAMSULOSIN HYDROCHLORIDE 0.4 MG/1
1 CAPSULE ORAL
Qty: 4 | Refills: 0
Start: 2020-02-27 | End: 2020-03-01

## 2020-02-27 RX ADMIN — MORPHINE SULFATE 4 MILLIGRAM(S): 50 CAPSULE, EXTENDED RELEASE ORAL at 12:53

## 2020-02-27 RX ADMIN — ONDANSETRON 4 MILLIGRAM(S): 8 TABLET, FILM COATED ORAL at 14:53

## 2020-02-27 RX ADMIN — TAMSULOSIN HYDROCHLORIDE 0.4 MILLIGRAM(S): 0.4 CAPSULE ORAL at 19:49

## 2020-02-27 RX ADMIN — Medication 15 MILLIGRAM(S): at 14:52

## 2020-02-27 RX ADMIN — SODIUM CHLORIDE 2000 MILLILITER(S): 9 INJECTION, SOLUTION INTRAVENOUS at 12:53

## 2020-02-27 NOTE — ED PROVIDER NOTE - OBJECTIVE STATEMENT
39yM with PMH kidney stones no other medical problems nkda p/w RLQ pain X 4 days, sharp intermittent, now worsening today to severe 9/10 pain, radiating to back. no urinary sx fever chills n/v/d.

## 2020-02-27 NOTE — ED PROVIDER NOTE - CLINICAL SUMMARY MEDICAL DECISION MAKING FREE TEXT BOX
39yM p/w RLQ pain similar to prior, found to have new 6mm distal R ureteral stone w/ mild hydronephrosis. Pain only transiently mildly improved w/ morphine but repsonded well to toradol.  UA w/o UTI.  Labs w/ VON (Cr 1.6 up from prior 1.0 --> 1.2).  Urology consulted given large stone and VON, recommend supportive care (zofran, NSAIDs, PO hydration, flomax), strain urine, f/u Dr. Yousif in the office on Tues 3/3 for ureteroscopy.  Pt reassessed, pain well controlled, abd soft/benign, agreeable to plan of care.  Reviewed home care instructions, plan for o/p f/u.  Ok to dc with return precautions.

## 2020-02-27 NOTE — CONSULT NOTE ADULT - ASSESSMENT
39y old Male with a past hx of kidney stone (passed spontaneously) presents to the ED with RLQ pain x 4 days. CT abdomen and pelvis shows mild right hydronephrosis and hydroureter down to the level of an 6 mm obstructing stone at the level of the mid pelvis.     A) Urolithiasis    Plan:  ·	drink plenty of fluids   ·	pain control    ·	recommend flomax   ·	strain urine to send stone fragments for analysis   ·	decrease salt intake in diet    ·	f/u as outpatient with Dr. Yousif for stone management next week   ·	come back to the ED if uncontrolled pain or fever   ·	case discussed with Dr. Yousif

## 2020-02-27 NOTE — ED PROVIDER NOTE - NSFOLLOWUPINSTRUCTIONS_ED_ALL_ED_FT
You were seen in the ED for right sided abdominal pain.  Workup shows a 6mm kidney stone on the right side that is likely causing your pain.  You should take toradol for the pain, zofran for nausea/vomiting as needed and flomax to help the stone pass.  It is very important to drink plenty of fluids to stay well hydrated.  You should also strain all your urine so that you can catch stone crystals once you pass the stone; this way the crystals can be sent for analysis to figure out what type of stone and how best to help keep you from getting more stones.  You should be aware that any stone over 5mm is harder to pass and you may require a procedure to help you pass the stone.  This is why you need to follow up with urology.  You are also higher risk for infection with a retained stone.  If you develop fever, severe pain, uncontrollable vomiting or other concerns, return to the ED.    You should call Dr. Yousif's office tomorrow morning to make an appointment for TUESDAY, MARCH 3rd for ureteroscopy.    Renal Colic     Renal colic is pain that is caused by passing a kidney stone. The pain can be sharp and severe. It may be felt in the back, abdomen, side (flank), or groin. It can cause nausea. Renal colic can come and go.  Follow these instructions at home:  Watch your condition for any changes. The following actions may help to lessen any discomfort that you are feeling:  Medicines     Take over-the-counter and prescription medicines only as told by your health care provider.Do not drive or use heavy machinery while taking prescription pain medicine.Eating and drinking        Drink enough fluid to keep your urine pale yellow. You may be instructed to drink at least 8–10 glasses of water each day. Follow instructions from your health care provider.If directed, change your diet. This may include:  Limiting how much sodium you eat. You may need to eat less than 2 grams (2,000 mg) per day.Eating more fruits and vegetables.Limiting how much animal protein, such as red meat, poultry, fish, and eggs, you eat.Avoiding foods such as spinach, rhubarb, sweet potatoes, and nuts. These make kidney stones more likely to form.Follow instructions from your health care provider about eating or drinking restrictions.General instructions     Keep all follow-up visits as told by your health care provider. This is important.Collect urine samples as told by your health care provider. You may need to collect a urine sample:  24 hours after you pass the stone.8–12 weeks after passing the kidney stone, and every 6–12 months after that.Strain your urine every time you urinate, for as long as directed. Use the strainer that your health care provider recommends.Do not throw out the kidney stone after passing it. Keep the stone so it can be tested by your health care provider. Testing the makeup of your kidney stone may help understand how to prevent you from getting kidney stones in the future.Contact a health care provider if:  You have a fever or chills.Your urine smells bad or looks cloudy.You have pain or burning when you pass urine.Get help right away if:  Your flank pain or groin pain suddenly worsens.You become confused or disoriented or you lose consciousness.Summary  Renal colic is pain that is caused by passing a kidney stone.Take over-the-counter and prescription medicines only as told by your health care provider.Drink enough fluid to keep your urine pale yellow. You may be instructed to drink at least 8–10 glasses of water each day. Follow instructions from your health care provider.Strain your urine every time you urinate, for as long as directed. Use the strainer that your health care provider recommends.Do not throw out the kidney stone after passing it. Keep the stone so it can be tested by your health care provider.

## 2020-02-27 NOTE — ED PROVIDER NOTE - CARE PROVIDER_API CALL
Korin Yousif)  Urology  [address] [place] [address], [address]  [place] [address]  Phone: [phone]  Fax: [phone]  Follow Up Time:

## 2020-02-27 NOTE — CONSULT NOTE ADULT - SUBJECTIVE AND OBJECTIVE BOX
Consult:    HPI: Patient is a 39y old Male with a past hx of kidney stone (passed spontaneously) presents to the ED with RLQ pain for the past 4 days. Pt does not follow a Urologist. Pt describes pain as sharp that worsens at night but is relieved after urination. Pt admits to drinking lots of fluids since the pain began on Monday. Previous admission from November has BUN/Cr. 15/1.2. Pt denies fever, chills, N/V, frequency, straining, nocturia, or hematuria.      PAST MEDICAL & SURGICAL HISTORY:  High cholesterol  No significant past surgical history      REVIEW OF SYSTEMS:    [ x ] a 10 point review of systems was negative except where noted    [  ]  Due to altered mental status/intubation, subjective information was not able t be obtained from the patient.  History was obtained, to the extent possible, from review of the chart and collateral sources of information.        MEDICATIONS  (STANDING):  lactated ringers Bolus 1000 milliLiter(s) IV Bolus once    MEDICATIONS  (PRN):      Allergies    No Known Allergies    Intolerances        SOCIAL HISTORY: No illicit drug use    FAMILY HISTORY:  No pertinent family history in first degree relatives      Vital Signs Last 24 Hrs  T(C): 36.6 (2020 12:04), Max: 36.6 (2020 12:04)  T(F): 97.8 (2020 12:04), Max: 97.8 (2020 12:04)  HR: 85 (2020 12:04) (85 - 85)  BP: 148/70 (2020 12:04) (148/70 - 148/70)  RR: 18 (2020 12:04) (18 - 18)  SpO2: 97% (2020 12:04) (97% - 97%)    Daily     Daily     PHYSICAL EXAM:    Constitutional: NAD, well-developed  HEENT: NC/AT  Back: + R CVA tenderness  Respiratory: No accessory respiratory muscle use  Abd: Soft, NT/ND  no organomegally  Extremities: no edema  Neurological: A/O x 3  Psychiatric: Normal mood, normal affect  Skin: No rashes    I&O's Summary      LABS:                        15.9   12.65 )-----------( 257      ( 2020 13:20 )             45.3         139  |  100  |  23<H>  ----------------------------<  139<H>  4.2   |  26  |  1.6<H>    Ca    9.4      2020 13:20    TPro  7.4  /  Alb  4.7  /  TBili  0.4  /  DBili  x   /  AST  21  /  ALT  43<H>  /  AlkPhos  87      PT/INR - ( 2020 13:20 )   PT: 11.40 sec;   INR: 0.99 ratio         PTT - ( 2020 13:20 )  PTT:33.7 sec  Urinalysis Basic - ( 2020 13:20 )    Color: Light Yellow / Appearance: Clear / S.019 / pH: x  Gluc: x / Ketone: Negative  / Bili: Negative / Urobili: <2 mg/dL   Blood: x / Protein: Negative / Nitrite: Negative   Leuk Esterase: Negative / RBC: 4 /HPF / WBC 2 /HPF   Sq Epi: x / Non Sq Epi: 0 /HPF / Bacteria: Negative      Urine Culture:   Culture - Urine (19 @ 15:40)    Specimen Source: .Urine Clean Catch (Midstream)    Culture Results:   <10,000 CFU/mL Normal Urogenital Iva      RADIOLOGY & ADDITIONAL STUDIES:  < from: CT Abdomen and Pelvis w/ IV Cont (20 @ 15:46) >    EXAM:  CT ABDOMEN AND PELVIS IC            PROCEDURE DATE:  2020            INTERPRETATION:  REASON FOR EXAM / CLINICAL STATEMENT: RLQ pain, hx kid stones    TECHNIQUE: Contiguous axial CT images were obtained from the lower chest to the pubic symphysis with intravenous contrast.   Reformatted images in the coronal and sagittal planes were acquired.    COMPARISON CT:    OTHER STUDIES USED FOR CORRELATION: None.         FINDINGS    LOWER CHEST: There are mild dependent atelectatic changes and subpleural reticulations at the lung bases, otherwise the lung bases are clear. No pleural or pericardial effusion.    HEPATIC: There is hepatic steatosis with no evidence of mass or bile duct dilatation.      BILIARY: The gallbladder is contracted. No calcified gallstones are noted.     SPLEEN: Unremarkable.     PANCREAS: The pancreas is normal in size and configuration. No evidence of mass or pancreatitis.     ADRENAL GLANDS: Unremarkable.        KIDNEYS: There is mild right hydronephrosis and hydroureter down to the level of an obstructing stone at the level of the mid pelvis. The stone measures 6.0 mm.    No evidence of left sided hydronephrosis, calcified stones, or solid mass.     ABDOMINOPELVIC NODES: Unremarkable.    PELVIC ORGANS:Prostatic calcifications are noted. No evidence of pelvic mass, lymphadenopathy, or fluid collection..        PERITONEUM/MESENTERY/BOWEL: No evidence of obstruction, colitis, inflammatory process, or ascites within the abdomen or pelvis. The appendixis normal in appearance.    BONES/SOFT TISSUES: Mild degenerative changes of the spine are noted. Sclerotic changes at the sacroiliac joints.    OTHER: No vascular abnormalities noted.      IMPRESSION: There is mild right hydronephrosis and hydroureter down to the level of an obstructing stone at the level of the mid pelvis. The stone measures 6.0 mm.                    JOSUE RAY M.D., ATTENDING RADIOLOGIST  This document has been electronically signed. 2020  4:41PM                < end of copied text >

## 2020-02-27 NOTE — ED PROVIDER NOTE - ATTENDING CONTRIBUTION TO CARE
39yM kidney stones a few months ago p/w RLQ pain similar to prior.  No fevers, dysuria or sig n/v.  Says pain has been ongoing x4d and worsened today.  Last stone was 5cm and passed w/o intervention.    CONSTITUTIONAL: well developed; well nourished; well appearing in no acute distress  HEAD: normocephalic; atraumatic  EYES: no conjunctival injection, no scleral icterus  ENT: no nasal discharge; airway clear.  NECK: supple; non tender. + full passive ROM in all directions  CARD: warm and well perfused, not tachycardic  RESP: breathing comfortably on RA, speaking in full sentences w/o distress  ABD: soft; non-distended; mild RLQ tenderness, no rebound, no guarding, no pulsatile abdominal mass  EXT: moving all extremities spontaneously, normal ROM. No clubbing, cyanosis or edema  SKIN: warm and dry, no lesions noted  NEURO: alert, oriented, CN II-XII grossly intact, motor and sensory grossly intact, speech nonslurred, stable gait, no focal deficits. GCS 15  PSYCH: calm, cooperative, appropriate, good eye contact, logical thought process, no apparent danger to self or others

## 2020-02-27 NOTE — ED PROVIDER NOTE - PHYSICAL EXAMINATION
Vital Signs: I have reviewed the initial vital signs.  Constitutional: NAD, well-nourished, appears stated age, no acute distress.  HEENT: Airway patent, moist MM, no erythema/swelling/deformity of oral structures. EOMI, PERRLA.  CV: regular rate, regular rhythm, well-perfused extremities, 2+ b/l DP and radial pulses equal.  Lungs: BCTA, no increased WOB.  ABD: RLQ ttp otherwise NTND, no guarding or rebound, no pulsatile mass, no hernias.   MSK: Neck supple, nontender, nl ROM, no stepoff. Chest nontender. Back nontender in TLS spine or to b/l bony structures or flanks. Ext nontender, nl rom, no deformity. +R CVA TTP  INTEG: Skin warm, dry, no rash.  NEURO: A&Ox3, moving all extremities, normal speech  PSYCH: Calm, cooperative, normal affect and interaction.

## 2020-02-27 NOTE — ED PROVIDER NOTE - PATIENT PORTAL LINK FT
You can access the FollowMyHealth Patient Portal offered by [facility] by registering at the following website: [email or web address]. By joining [employer]’s FollowMyHealth portal, you will also be able to view your health information using other applications (apps) compatible with our system.

## 2020-02-27 NOTE — ED PROVIDER NOTE - PROGRESS NOTE DETAILS
pain improved s/p toradol and morphine d/w urology re: mild hydro and 6mm obstructing stone in context of bruno. Otilio will see patient

## 2020-02-27 NOTE — ED PROVIDER NOTE - CARE PROVIDERS DIRECT ADDRESSES
[email or web address] unsure

## 2020-02-27 NOTE — ED ADULT NURSE NOTE - OBJECTIVE STATEMENT
Patient c/o Right sided abdominal and flank pain with nauseas for the last 3 days. Patient denies any hematuria, CP SOB Fevers /chills and urinary symptoms.

## 2020-02-27 NOTE — ED ADULT NURSE NOTE - NSIMPLEMENTINTERV_GEN_ALL_ED
Implemented All Universal Safety Interventions:  Philipp to call system. Call bell, personal items and telephone within reach. Instruct patient to call for assistance. Room bathroom lighting operational. Non-slip footwear when patient is off stretcher. Physically safe environment: no spills, clutter or unnecessary equipment. Stretcher in lowest position, wheels locked, appropriate side rails in place.

## 2020-02-28 LAB
CULTURE RESULTS: NO GROWTH — SIGNIFICANT CHANGE UP
SPECIMEN SOURCE: SIGNIFICANT CHANGE UP

## 2020-03-07 ENCOUNTER — INPATIENT (INPATIENT)
Facility: HOSPITAL | Age: 40
LOS: 3 days | Discharge: HOME HEALTH | End: 2020-03-11
Attending: INTERNAL MEDICINE | Admitting: INTERNAL MEDICINE
Payer: COMMERCIAL

## 2020-03-07 VITALS
HEART RATE: 112 BPM | RESPIRATION RATE: 18 BRPM | TEMPERATURE: 103 F | OXYGEN SATURATION: 97 % | DIASTOLIC BLOOD PRESSURE: 61 MMHG | SYSTOLIC BLOOD PRESSURE: 130 MMHG

## 2020-03-07 LAB
ALBUMIN SERPL ELPH-MCNC: 4.7 G/DL — SIGNIFICANT CHANGE UP (ref 3.5–5.2)
ALP SERPL-CCNC: 90 U/L — SIGNIFICANT CHANGE UP (ref 30–115)
ALT FLD-CCNC: 50 U/L — HIGH (ref 0–41)
ANION GAP SERPL CALC-SCNC: 16 MMOL/L — HIGH (ref 7–14)
APPEARANCE UR: CLEAR — SIGNIFICANT CHANGE UP
APTT BLD: 31.7 SEC — SIGNIFICANT CHANGE UP (ref 27–39.2)
AST SERPL-CCNC: 29 U/L — SIGNIFICANT CHANGE UP (ref 0–41)
BACTERIA # UR AUTO: NEGATIVE — SIGNIFICANT CHANGE UP
BASOPHILS # BLD AUTO: 0.06 K/UL — SIGNIFICANT CHANGE UP (ref 0–0.2)
BASOPHILS NFR BLD AUTO: 0.3 % — SIGNIFICANT CHANGE UP (ref 0–1)
BILIRUB SERPL-MCNC: 0.4 MG/DL — SIGNIFICANT CHANGE UP (ref 0.2–1.2)
BILIRUB UR-MCNC: NEGATIVE — SIGNIFICANT CHANGE UP
BLD GP AB SCN SERPL QL: SIGNIFICANT CHANGE UP
BUN SERPL-MCNC: 15 MG/DL — SIGNIFICANT CHANGE UP (ref 10–20)
CALCIUM SERPL-MCNC: 9.5 MG/DL — SIGNIFICANT CHANGE UP (ref 8.5–10.1)
CHLORIDE SERPL-SCNC: 99 MMOL/L — SIGNIFICANT CHANGE UP (ref 98–110)
CO2 SERPL-SCNC: 22 MMOL/L — SIGNIFICANT CHANGE UP (ref 17–32)
COLOR SPEC: SIGNIFICANT CHANGE UP
CREAT SERPL-MCNC: 1.2 MG/DL — SIGNIFICANT CHANGE UP (ref 0.7–1.5)
DIFF PNL FLD: ABNORMAL
EOSINOPHIL # BLD AUTO: 0.08 K/UL — SIGNIFICANT CHANGE UP (ref 0–0.7)
EOSINOPHIL NFR BLD AUTO: 0.4 % — SIGNIFICANT CHANGE UP (ref 0–8)
EPI CELLS # UR: 0 /HPF — SIGNIFICANT CHANGE UP (ref 0–5)
GLUCOSE SERPL-MCNC: 116 MG/DL — HIGH (ref 70–99)
GLUCOSE UR QL: NEGATIVE — SIGNIFICANT CHANGE UP
HCT VFR BLD CALC: 45.3 % — SIGNIFICANT CHANGE UP (ref 42–52)
HGB BLD-MCNC: 15.5 G/DL — SIGNIFICANT CHANGE UP (ref 14–18)
HYALINE CASTS # UR AUTO: 1 /LPF — SIGNIFICANT CHANGE UP (ref 0–7)
IMM GRANULOCYTES NFR BLD AUTO: 0.3 % — SIGNIFICANT CHANGE UP (ref 0.1–0.3)
INR BLD: 1.27 RATIO — SIGNIFICANT CHANGE UP (ref 0.65–1.3)
KETONES UR-MCNC: NEGATIVE — SIGNIFICANT CHANGE UP
LACTATE SERPL-SCNC: 1.6 MMOL/L — SIGNIFICANT CHANGE UP (ref 0.7–2)
LACTATE SERPL-SCNC: 2.1 MMOL/L — HIGH (ref 0.7–2)
LEUKOCYTE ESTERASE UR-ACNC: NEGATIVE — SIGNIFICANT CHANGE UP
LYMPHOCYTES # BLD AUTO: 1.7 K/UL — SIGNIFICANT CHANGE UP (ref 1.2–3.4)
LYMPHOCYTES # BLD AUTO: 9.4 % — LOW (ref 20.5–51.1)
MCHC RBC-ENTMCNC: 31.7 PG — HIGH (ref 27–31)
MCHC RBC-ENTMCNC: 34.2 G/DL — SIGNIFICANT CHANGE UP (ref 32–37)
MCV RBC AUTO: 92.6 FL — SIGNIFICANT CHANGE UP (ref 80–94)
MONOCYTES # BLD AUTO: 0.81 K/UL — HIGH (ref 0.1–0.6)
MONOCYTES NFR BLD AUTO: 4.5 % — SIGNIFICANT CHANGE UP (ref 1.7–9.3)
NEUTROPHILS # BLD AUTO: 15.43 K/UL — HIGH (ref 1.4–6.5)
NEUTROPHILS NFR BLD AUTO: 85.1 % — HIGH (ref 42.2–75.2)
NITRITE UR-MCNC: NEGATIVE — SIGNIFICANT CHANGE UP
NRBC # BLD: 0 /100 WBCS — SIGNIFICANT CHANGE UP (ref 0–0)
PH UR: 6 — SIGNIFICANT CHANGE UP (ref 5–8)
PLATELET # BLD AUTO: 329 K/UL — SIGNIFICANT CHANGE UP (ref 130–400)
POTASSIUM SERPL-MCNC: 4.5 MMOL/L — SIGNIFICANT CHANGE UP (ref 3.5–5)
POTASSIUM SERPL-SCNC: 4.5 MMOL/L — SIGNIFICANT CHANGE UP (ref 3.5–5)
PROT SERPL-MCNC: 7.7 G/DL — SIGNIFICANT CHANGE UP (ref 6–8)
PROT UR-MCNC: SIGNIFICANT CHANGE UP
PROTHROM AB SERPL-ACNC: 14.6 SEC — HIGH (ref 9.95–12.87)
RBC # BLD: 4.89 M/UL — SIGNIFICANT CHANGE UP (ref 4.7–6.1)
RBC # FLD: 11.6 % — SIGNIFICANT CHANGE UP (ref 11.5–14.5)
RBC CASTS # UR COMP ASSIST: 1 /HPF — SIGNIFICANT CHANGE UP (ref 0–4)
SODIUM SERPL-SCNC: 137 MMOL/L — SIGNIFICANT CHANGE UP (ref 135–146)
SP GR SPEC: 1.02 — SIGNIFICANT CHANGE UP (ref 1.01–1.02)
UROBILINOGEN FLD QL: SIGNIFICANT CHANGE UP
WBC # BLD: 18.14 K/UL — HIGH (ref 4.8–10.8)
WBC # FLD AUTO: 18.14 K/UL — HIGH (ref 4.8–10.8)
WBC UR QL: 1 /HPF — SIGNIFICANT CHANGE UP (ref 0–5)

## 2020-03-07 PROCEDURE — 74177 CT ABD & PELVIS W/CONTRAST: CPT | Mod: 26

## 2020-03-07 PROCEDURE — 99221 1ST HOSP IP/OBS SF/LOW 40: CPT | Mod: GC

## 2020-03-07 PROCEDURE — 99291 CRITICAL CARE FIRST HOUR: CPT

## 2020-03-07 RX ORDER — SODIUM CHLORIDE 9 MG/ML
2000 INJECTION INTRAMUSCULAR; INTRAVENOUS; SUBCUTANEOUS ONCE
Refills: 0 | Status: COMPLETED | OUTPATIENT
Start: 2020-03-07 | End: 2020-03-07

## 2020-03-07 RX ORDER — MORPHINE SULFATE 50 MG/1
4 CAPSULE, EXTENDED RELEASE ORAL ONCE
Refills: 0 | Status: DISCONTINUED | OUTPATIENT
Start: 2020-03-07 | End: 2020-03-07

## 2020-03-07 RX ORDER — METRONIDAZOLE 500 MG
500 TABLET ORAL EVERY 8 HOURS
Refills: 0 | Status: DISCONTINUED | OUTPATIENT
Start: 2020-03-07 | End: 2020-03-07

## 2020-03-07 RX ORDER — ENOXAPARIN SODIUM 100 MG/ML
40 INJECTION SUBCUTANEOUS DAILY
Refills: 0 | Status: DISCONTINUED | OUTPATIENT
Start: 2020-03-07 | End: 2020-03-10

## 2020-03-07 RX ORDER — MORPHINE SULFATE 50 MG/1
2 CAPSULE, EXTENDED RELEASE ORAL EVERY 4 HOURS
Refills: 0 | Status: DISCONTINUED | OUTPATIENT
Start: 2020-03-07 | End: 2020-03-10

## 2020-03-07 RX ORDER — CIPROFLOXACIN LACTATE 400MG/40ML
400 VIAL (ML) INTRAVENOUS EVERY 12 HOURS
Refills: 0 | Status: DISCONTINUED | OUTPATIENT
Start: 2020-03-07 | End: 2020-03-07

## 2020-03-07 RX ORDER — SODIUM CHLORIDE 9 MG/ML
1000 INJECTION, SOLUTION INTRAVENOUS
Refills: 0 | Status: DISCONTINUED | OUTPATIENT
Start: 2020-03-07 | End: 2020-03-10

## 2020-03-07 RX ORDER — VANCOMYCIN HCL 1 G
1000 VIAL (EA) INTRAVENOUS ONCE
Refills: 0 | Status: COMPLETED | OUTPATIENT
Start: 2020-03-07 | End: 2020-03-07

## 2020-03-07 RX ORDER — ACETAMINOPHEN 500 MG
975 TABLET ORAL ONCE
Refills: 0 | Status: COMPLETED | OUTPATIENT
Start: 2020-03-07 | End: 2020-03-07

## 2020-03-07 RX ORDER — SODIUM CHLORIDE 9 MG/ML
1000 INJECTION INTRAMUSCULAR; INTRAVENOUS; SUBCUTANEOUS ONCE
Refills: 0 | Status: COMPLETED | OUTPATIENT
Start: 2020-03-07 | End: 2020-03-07

## 2020-03-07 RX ORDER — ACETAMINOPHEN 500 MG
650 TABLET ORAL EVERY 6 HOURS
Refills: 0 | Status: DISCONTINUED | OUTPATIENT
Start: 2020-03-07 | End: 2020-03-10

## 2020-03-07 RX ORDER — CEFEPIME 1 G/1
2000 INJECTION, POWDER, FOR SOLUTION INTRAMUSCULAR; INTRAVENOUS EVERY 8 HOURS
Refills: 0 | Status: DISCONTINUED | OUTPATIENT
Start: 2020-03-07 | End: 2020-03-10

## 2020-03-07 RX ORDER — CEFEPIME 1 G/1
2000 INJECTION, POWDER, FOR SOLUTION INTRAMUSCULAR; INTRAVENOUS ONCE
Refills: 0 | Status: COMPLETED | OUTPATIENT
Start: 2020-03-07 | End: 2020-03-07

## 2020-03-07 RX ADMIN — MORPHINE SULFATE 4 MILLIGRAM(S): 50 CAPSULE, EXTENDED RELEASE ORAL at 18:53

## 2020-03-07 RX ADMIN — MORPHINE SULFATE 4 MILLIGRAM(S): 50 CAPSULE, EXTENDED RELEASE ORAL at 16:28

## 2020-03-07 RX ADMIN — SODIUM CHLORIDE 1000 MILLILITER(S): 9 INJECTION INTRAMUSCULAR; INTRAVENOUS; SUBCUTANEOUS at 17:01

## 2020-03-07 RX ADMIN — CEFEPIME 100 MILLIGRAM(S): 1 INJECTION, POWDER, FOR SOLUTION INTRAMUSCULAR; INTRAVENOUS at 17:26

## 2020-03-07 RX ADMIN — MORPHINE SULFATE 4 MILLIGRAM(S): 50 CAPSULE, EXTENDED RELEASE ORAL at 17:04

## 2020-03-07 RX ADMIN — Medication 1000 MILLIGRAM(S): at 21:26

## 2020-03-07 RX ADMIN — Medication 100 MILLIGRAM(S): at 17:01

## 2020-03-07 RX ADMIN — Medication 650 MILLIGRAM(S): at 23:36

## 2020-03-07 RX ADMIN — Medication 250 MILLIGRAM(S): at 18:12

## 2020-03-07 RX ADMIN — Medication 975 MILLIGRAM(S): at 16:28

## 2020-03-07 RX ADMIN — SODIUM CHLORIDE 1000 MILLILITER(S): 9 INJECTION INTRAMUSCULAR; INTRAVENOUS; SUBCUTANEOUS at 21:06

## 2020-03-07 RX ADMIN — Medication 975 MILLIGRAM(S): at 17:04

## 2020-03-07 RX ADMIN — SODIUM CHLORIDE 2000 MILLILITER(S): 9 INJECTION INTRAMUSCULAR; INTRAVENOUS; SUBCUTANEOUS at 16:25

## 2020-03-07 RX ADMIN — MORPHINE SULFATE 4 MILLIGRAM(S): 50 CAPSULE, EXTENDED RELEASE ORAL at 21:22

## 2020-03-07 RX ADMIN — MORPHINE SULFATE 4 MILLIGRAM(S): 50 CAPSULE, EXTENDED RELEASE ORAL at 21:26

## 2020-03-07 NOTE — H&P ADULT - NSHPPHYSICALEXAM_GEN_ALL_CORE
Vital Signs Last 24 Hrs  T(C): 37.1 (07 Mar 2020 18:54), Max: 39.2 (07 Mar 2020 15:11)  T(F): 98.7 (07 Mar 2020 18:54), Max: 102.6 (07 Mar 2020 15:11)  HR: 85 (07 Mar 2020 18:54) (85 - 112)  BP: 141/64 (07 Mar 2020 17:03) (130/61 - 141/64)  RR: 18 (07 Mar 2020 18:54) (16 - 18)  SpO2: 98% (07 Mar 2020 18:54) (97% - 98%)    PHYSICAL EXAM:  GENERAL: NAD, speaks in full sentences, no signs of respiratory distress  HEAD:  Atraumatic, Normocephalic  EYES: EOMI, PERRLA,   CHEST/LUNG: Clear to auscultation bilaterally; No wheeze; No crackles; No accessory muscles used  HEART: Regular rate and rhythm; No murmurs;   ABDOMEN: Soft, Nontender, Nondistended; Bowel sounds present; No guarding  : + firm, no fluctuant, indurated area on the L lateral perineum extending to base of scrotum.   EXTREMITIES:  2+ Peripheral Pulses, No cyanosis or edema  PSYCH: AAOx3  NEUROLOGY: non-focal  SKIN: No rashes or lesions

## 2020-03-07 NOTE — H&P ADULT - ATTENDING COMMENTS
40 yo M pt w/ a hx of a rosa-rectal abscess (in 04/2019 underwent I&D at the time) presenting now w/ pain and swelling of the perineal area. Reports having chronic pain in the area since the original event. Now has worsening of symptoms and swelling ongoing for 3-4 days and persistent since onset. Tmax of 102 at home. Pain described as aching and 5-6/10 in severity, alleviated by rest and pain medications and exacerbated by movement. Cannot think of any precipitating or risk factors and is careful about hygiene. States that his father had a similar infection in the past. Denies any axillary involvement, myalgias, arthralgias, SOB, chest pain, blood per rectum, dysuria or hematuria. ROS negative except as aforementioned. No significant PMHx.    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

## 2020-03-07 NOTE — ED ADULT NURSE NOTE - OBJECTIVE STATEMENT
38 y/o male complaint of rectal abscess and fever x 3 days. Patient has a hx of rectal abscess 1 year ago with prior drainage and oral antibiotic use. Patient now developed same abscess on rectum that is red and painful. Patient also complaint of fever x 3 days tmax 102, last dose of ibuprofen was 730 am this morning. patient denies any drainage from site, rectal bleeding, burning when urinating, chest pain, shortness of breath, headache, abdominal pain.

## 2020-03-07 NOTE — ED PROVIDER NOTE - PHYSICAL EXAMINATION
CONSTITUTIONAL: Well-appearing; well-nourished; in no apparent distress.   NECK: Supple; non-tender; no cervical lymphadenopathy. No JVD.   CARDIOVASCULAR: + tachycardia. no murmurs, rubs, or gallops.   RESPIRATORY: Normal chest excursion with respiration; breath sounds clear and equal bilaterally; no wheezes, rhonchi, or rales.  GI/: Normal bowel sounds; non-distended; non-tender; no palpable organomegaly.    exam chaperoned by Dr. Whitmore. + swelling/ttp/fluctuance noted to perineum extending to rt gluteal region. No testicular swelling/ttp. No involvement to rectum.   MS: No evidence of trauma or deformity. Normal ROM in all four extremities; non-tender to palpation; distal pulses are normal.   SKIN: Normal for age and race; warm; dry; good turgor; no apparent lesions or exudate.   NEURO/PSYCH: A & O x 4; grossly unremarkable. mood and manner are appropriate.

## 2020-03-07 NOTE — ED ADULT NURSE NOTE - NSIMPLEMENTINTERV_GEN_ALL_ED
Implemented All Universal Safety Interventions:  [place]urt to call system. Call bell, personal items and telephone within reach. Instruct patient to call for assistance. Room bathroom lighting operational. Non-slip footwear when patient is off stretcher. Physically safe environment: no spills, clutter or unnecessary equipment. Stretcher in lowest position, wheels locked, appropriate side rails in place.

## 2020-03-07 NOTE — CONSULT NOTE ADULT - ASSESSMENT
<Elias Szymanski - Last Filed: 11/14/18 18:36>





History of Present Illness





- History of Present Illness


History of Present Illness: 





CRITICAL CARE CONSULT NOTE FOR DR. LYRIC Szymanski PGY-1





88 y/o F with PMHx of HTN for 35+ years, hx of uterine cancer presented to [facility] 

on 11/9 with complaints of palpitations and chest discomfort, fluttering without

chest pain, sob, nausea, vomiting after drinking caffeinated coffee admitted to 

[facility] for hypertensive urgency with BPs in the 180s systolic. She reports checking

her BP at home, and noticed difficulty controlling HTN after discontinuing 

valsartan and starting losartan. She was admitted to the telemetry floor and gi

ruben antihypertensive therapy including amlodipine 10mg, chlorthalidone 25mg, 

clonidine 0.2mg, losartan 100mg, metoprolol succinate 25mg. She was evaluated 

for renovascular hypertension with renal duplex revealing tortuous main renal 

arteries with elevated velocities in the R side and bilateral renal artery 

stenosis on MRA. Pt is s/p revascularization of renal arteries with b/l renal 

stent placement.





Upon interview: She denies any acute complaints. 12 point ROS is negative. 





PMHx: HTN, Hx of uterine cancer,


PSH: thyroid tumor resection


All: codeine, moxifloxacin, 


FH: noncontributiory


SH: Denies tobacco/alcohol





Review of Systems





- Review of Systems


Review of Systems: 





per HPI





Past Patient History





- Infectious Disease


Hx of Infectious Diseases: None





- Past Social History


Smoking Status: Former Smoker





- CARDIAC


Hx Hypertension: Yes





- PULMONARY


Hx Bronchitis: Yes


Hx Pneumonia: Yes





- NEUROLOGICAL


Hx Dizziness: Yes





- HEENT


Hx Cataracts: Yes (OU repair)





- RENAL


Hx Chronic Kidney Disease: No





- ENDOCRINE/METABOLIC


Hx Hypothyroidism: Yes (thyroidectomy)





- HEMATOLOGICAL/ONCOLOGICAL


Hx Cancer: Yes (hx of uterine ca)





- MUSCULOSKELETAL/RHEUMATOLOGICAL


Hx Musculoskeletal Disorders: Yes


Hx Falls: No


Other/Comment: rt rotator cuff sx





- GASTROINTESTINAL


Hx Gastrointestinal Disorders: Yes


Hx Ulcer: Yes





- GENITOURINARY/GYNECOLOGICAL


Other/Comment: hysterectomy





- PSYCHIATRIC


Hx Psychophysiologic Disorder: No


Hx Substance Use: No





- SURGICAL HISTORY


Hx Surgeries: Yes


Hx Hysterectomy: Yes


Hx Orthopedic Surgery: Yes (rt rotator cuff)


Other/Comment: thyroidectomy





- ANESTHESIA


Hx Anesthesia Reactions: No


Hx Malignant Hyperthermia: No





Meds


Allergies/Adverse Reactions: 


                                    Allergies











Allergy/AdvReac Type Severity Reaction Status Date / Time


 


codeine Allergy Intermediate NAUSEA/VOMI Verified 01/04/17 10:11





   TING/VERTIG  





   O  


 


moxifloxacin HCl AdvReac Intermediate DIFFICULTY Verified 01/04/17 10:11





[From Avelox]   WALKING/TREMORS  














- Medications


Medications: 


                               Current Medications





Amlodipine Besylate (Norvasc)  10 mg PO DAILY [facility]


   Last Admin: 11/14/18 10:09 Dose:  Not Given


Aspirin (Aspirin Chewable)  81 mg PO DAILY [facility]


   Last Admin: 11/14/18 10:07 Dose:  81 mg


Bacitracin (Bacitracin)  1 gm TOP BID [facility]


   Last Admin: 11/14/18 10:07 Dose:  1 applic


Chlorthalidone (Hygroton)  25 mg PO DAILY [facility]


   Last Admin: 11/14/18 10:08 Dose:  25 mg


Clonidine HCl (Catapres)  0.2 mg PO TID [facility]


   Last Admin: 11/14/18 10:08 Dose:  Not Given


Clopidogrel Bisulfate (Plavix)  75 mg PO DAILY [facility]


Heparin Sodium (Porcine) (Heparin)  5,000 units SC Q12 [facility]; Protocol


Sodium Chloride (Sodium Chloride 0.45%)  1,000 mls @ 125 mls/hr IV .Q8H [facility]


   Stop: 11/16/18 12:00


   Last Admin: 11/14/18 14:55 Dose:  125 mls/hr


Losartan Potassium (Cozaar)  100 mg PO DAILY [facility]


   Last Admin: 11/14/18 10:08 Dose:  Not Given


Metoprolol Succinate (Toprol Xl)  25 mg PO DAILY [facility]


   Last Admin: 11/14/18 10:09 Dose:  Not Given


Pantoprazole Sodium (Protonix Inj)  40 mg IVP DAILY [facility]


Polyethylene Glycol (Miralax)  17 gm PO DAILY [facility]


   Last Admin: 11/14/18 10:09 Dose:  17 gm


Potassium Chloride (K-Dur 20 Meq Er Tab)  20 meq PO BRK [facility]


   Last Admin: 11/14/18 08:00 Dose:  20 meq











Physical Exam





- Constitutional


Appears: Well, Non-toxic, No Acute Distress





- Head Exam


Head Exam: ATRAUMATIC, NORMAL INSPECTION





- Eye Exam


Eye Exam: EOMI, Normal appearance





- ENT Exam


ENT Exam: Mucous Membranes Dry, Normal Exam





- Neck Exam


Neck exam: Positive for: Normal Inspection.  Negative for: Tenderness





- Respiratory Exam


Respiratory Exam: Clear to Auscultation Bilateral, NORMAL BREATHING PATTERN





- Cardiovascular Exam


Cardiovascular Exam: REGULAR RHYTHM, +S1, +S2





- GI/Abdominal Exam


GI & Abdominal Exam: Soft.  absent: Tenderness


Additional comments: 





R femoral dressing in place





- Extremities Exam


Extremities exam: Positive for: normal inspection.  Negative for: calf 

tenderness, pedal edema





- Back Exam


Back exam: NORMAL INSPECTION





- Neurological Exam


Neurological exam: Alert, CN II-XII Intact, Oriented x3





- Psychiatric Exam


Psychiatric exam: Normal Affect, Normal Mood





- Skin


Skin Exam: Dry, Intact, Warm





Results





- Vital Signs


Recent Vital Signs: 


                                Last Vital Signs











Temp  97.4 F L  11/14/18 08:47


 


Pulse  50 L  11/14/18 10:09


 


Resp  20   11/14/18 08:47


 


BP  128/65   11/14/18 10:09


 


Pulse Ox  97   11/14/18 08:47














- Labs


Result Diagrams: 


                                 11/10/18 06:00





                                 11/10/18 06:00





Assessment & Plan





- Assessment and Plan (Free Text)


Assessment: 





88 y/o admitted for hypertensive urgency likely secondary to renovascular 

hypertension seen on MRA/Renal duplex. She is s/p b/l renal artery 

revascularization with stents. Admitted to ICU for post-operative management. 


Plan: 





Hypertensive urgency 2/2 bilateral renal artery stenosis


s/p renal artery revascularization with b/l renal stents


continue oral anti-hypertensives:


amlodipine 10mg


chlorthalidone 25mg


clonidine 0.2mg


losartan 100mg


metoprolol 25mg


Continue aspirin/plavix per IR recs


Finney in place


NS bolus followed by maintenance fluid @ 80mls/hour


f/u nephrology recs for medication adjustments





DVT/GI Ppx: Hep/protonix


Dispo: Pt is s/p b/l renal revascularization with b/l stent placement. Tolerated

procedure well. No acute complaints. Vital signs stable. Will monitor in ICU 

overnight. 





Case seen, examined and discussed with attending physician, Dr. Roque





<Belle Lay - Last Filed: 11/15/18 12:50>





Meds





- Medications


Medications: 


                               Current Medications





Amlodipine Besylate (Norvasc)  10 mg PO DAILY [facility]


   Last Admin: 11/15/18 10:33 Dose:  10 mg


Aspirin (Aspirin Chewable)  81 mg PO DAILY [facility]


   Last Admin: 11/15/18 09:52 Dose:  81 mg


Bacitracin (Bacitracin)  1 gm TOP BID [facility]


   Last Admin: 11/15/18 10:33 Dose:  1 applic


Chlorthalidone (Hygroton)  25 mg PO DAILY [facility]


   Last Admin: 11/15/18 10:34 Dose:  25 mg


Clonidine HCl (Catapres)  0.2 mg PO TID [facility]


   Last Admin: 11/15/18 10:35 Dose:  0.2 mg


Clopidogrel Bisulfate (Plavix)  75 mg PO DAILY [facility]


   Last Admin: 11/15/18 10:36 Dose:  75 mg


Heparin Sodium (Porcine) (Heparin)  5,000 units SC Q12 [facility]; Protocol


   Last Admin: 11/15/18 10:34 Dose:  5,000 units


Sodium Chloride (Sodium Chloride 0.45%)  1,000 mls @ 125 mls/hr IV .Q8H [facility]


   Stop: 11/16/18 12:00


   Last Admin: 11/14/18 14:55 Dose:  125 mls/hr


Sodium Chloride (Sodium Chloride 0.45%)  1,000 mls @ 80 mls/hr IV .[id number] [facility]


   Last Admin: 11/15/18 08:36 Dose:  80 mls/hr


Losartan Potassium (Cozaar)  100 mg PO DAILY [facility]


   Last Admin: 11/15/18 10:35 Dose:  100 mg


Metoprolol Succinate (Toprol Xl)  25 mg PO DAILY [facility]


Pantoprazole Sodium (Protonix Ec Tab)  40 mg PO ACB [facility]


Polyethylene Glycol (Miralax)  17 gm PO DAILY [facility]


   Last Admin: 11/15/18 10:34 Dose:  17 gm


Potassium Chloride (K-Dur 20 Meq Er Tab)  20 meq PO BRK [facility]


   Last Admin: 11/15/18 08:30 Dose:  20 meq


Silver Sulfadiazine (Silvadene 1% 25 Gm)  0 gm TP DAILY [facility]











Results





- Vital Signs


Recent Vital Signs: 


                                Last Vital Signs











Temp  97.4 F L  11/15/18 04:00


 


Pulse  52 L  11/15/18 10:35


 


Resp  18   11/15/18 06:45


 


BP  174/61 H  11/15/18 10:33


 


Pulse Ox  98   11/15/18 06:45














- Labs


Result Diagrams: 


                                 11/15/18 05:30





                                 11/15/18 05:30


Labs: 


                         Laboratory Results - last 24 hr











  11/15/18 11/15/18 11/15/18





  05:30 05:30 05:30


 


WBC  8.4  


 


RBC  4.16  


 


Hgb  12.5  


 


Hct  38.9  


 


MCV  93.5  


 


MCH  30.0  


 


MCHC  32.1  


 


RDW  13.1  


 


Plt Count  218  


 


MPV  9.1  


 


Gran %  66.6  


 


Lymph % (Auto)  22.2  


 


Mono % (Auto)  8.2 H  


 


Eos % (Auto)  2.9  


 


Baso % (Auto)  0.1  


 


Gran #  5.58  


 


Lymph # (Auto)  1.9  


 


Mono # (Auto)  0.7 H  


 


Eos # (Auto)  0.2  


 


Baso # (Auto)  0.01  


 


PT    13.1 H


 


INR    1.14


 


APTT    28.0


 


Sodium   140 


 


Potassium   4.7 


 


Chloride   106 


 


Carbon Dioxide   28 


 


Anion Gap   11 


 


BUN   26 H 


 


Creatinine   1.0 


 


Est GFR ( Amer)   > 60 


 


Est GFR (Non-Af Amer)   53 


 


Random Glucose   94 


 


Calcium   9.8 


 


Phosphorus   3.7 


 


Magnesium   2.0 


 


Total Bilirubin   0.6 


 


AST   25 


 


ALT   22 


 


Alkaline Phosphatase   58 


 


Total Protein   7.0 


 


Albumin   3.8 


 


Globulin   3.2 


 


Albumin/Globulin Ratio   1.2 














Addendum


Addendum: 





11/14/18 17:49


ICU Attending Addendum 


Patient seen and examined on 11/14. Case reviewed on round with housestaff.  RAMILA gilbert with resident note above with the following additions/exceptions:





87F with uncontrolled htn s/p MRA/Renal duplex suggesting need for b/l renal 

artery revascularization with stents.  Awaiting patient to come out of prcoedure

then will monitor in MICU





cardio on board managing bp meds


await reecs from IR as well 





Rest of care as above


Belle Lay MD


Pulmonary Critical Care and Sleep Medicine


11/14/18 17:50 5 y/o male with perineal bascess - s/p I&D  - IV abx - Vanc and Zosyn  - pain control  - Daily packing changes  - f/u abscess culture  - IVF hydration  - Discussed with Dr. Alaniz  - Will follow

## 2020-03-07 NOTE — ED PROVIDER NOTE - OBJECTIVE STATEMENT
39 year old M with no pmhx c/o perineal abscess x 3 days with fever tmax 102F. Pt sts had similar abscess x 1 year ago that resolved s/p I &D. Pt denies any abdominal pain, nausea, vomiting, urinary symptoms, hx of DM, IVDA, alterations in bowel habits.

## 2020-03-07 NOTE — H&P ADULT - HISTORY OF PRESENT ILLNESS
39 years old male pt with past medical hx of kidney stones and DLD on diet control came to ER because of perineal pain and fever for 4 days.  As per patient for last 4 days, he is having pain in his perineal area, he feels a swelling that extends till the base of his scrotum, associated with fever, he denies any urethral or anal discharge.  He has h/o of perineal abscess almost a year ago in april 2019, s/p I and D, after that his pain never resolves, he had flar every month but it was controlled for last 6 months he takes good hygiene measure, He doesnt follow with any urologist  he is , monogamous relation with her wife only, was tested for HIV and DM in the past.  He denies any GI symptoms no h/o of eye redness, no back pain ( no symptoms for IBD).  While in ER, he was febrile to 102, tachycardic, ct scan showed   Peritoneal abscess measuring approximately 2.3 x 1.3 x 4 cm with extension to the posterior aspect of the scrotum.

## 2020-03-07 NOTE — H&P ADULT - NSHPLABSRESULTS_GEN_ALL_CORE
03-07    137  |  99  |  15  ----------------------------<  116<H>  4.5   |  22  |  1.2    Ca    9.5      07 Mar 2020 16:29    TPro  7.7  /  Alb  4.7  /  TBili  0.4  /  DBili  x   /  AST  29  /  ALT  50<H>  /  AlkPhos  90  03-07                          15.5   18.14 )-----------( 329      ( 07 Mar 2020 16:29 )             45.3     < from: CT Abdomen and Pelvis w/ IV Cont (03.07.20 @ 18:03) >    IMPRESSION:  Peritoneal abscess measuring approximately 2.3 x 1.3 x 4 cm with extension to the posterior aspect of the scrotum.    < end of copied text >

## 2020-03-07 NOTE — CONSULT NOTE ADULT - SUBJECTIVE AND OBJECTIVE BOX
HPI:  40 y/o male with pMHx of recurring perineal abscess extending to base of scrotum. Pt reports havingan I&D done last year and never followed up with a urologist. He states that the pain in the area never resolved and he occasionaly gets flare ups almost every two months since then. Reports fevers for the past 3 days up to Tmax 102. Pain has been progressively worsening ovee the area in the last 3 days. Denies any drainage. + vomiting episodes at home. Now with chills in ED.     I&D: Verbal consent for incision and drainage of abscess obtained at bedside. Pt fully understood risks and benefits of procedure and gave verbal ascent. All questions answered. Area was numbed circumferentially. 4cm incision made using 10 blade. using clamp I was able to break loculations. 40cc thick Bloody drainage was expressed. Culture obtained. Area was packed with 4'' of  2'' iodoform packing Pt tolerated it well without any complications.      PAST MEDICAL & SURGICAL HISTORY:  Rectal abscess  High cholesterol  No significant past surgical history      REVIEW OF SYSTEMS:    CONSTITUTIONAL:  No fevers or chills  HEENT: No visual changes  ENDO: No sweating  NECK: No pain or stiffness  MUSCULOSKELETAL: No back pain, no joint pain  RESPIRATORY: No shortness of breath  CARDIOVASCULAR: No chest pain  GASTROINTESTINAL: No abdominal or epigastric pain. No nausea, vomiting,  No diarrhea or constipation.   NEUROLOGICAL: No mental status changes  PSYCH: No depression, no mood changes  SKIN: No itching      MEDICATIONS  (STANDING):    MEDICATIONS  (PRN):      Allergies    No Known Allergies    Intolerances        SOCIAL HISTORY: No illicit drug use    FAMILY HISTORY:  No pertinent family history in first degree relatives      Vital Signs Last 24 Hrs  T(C): 37.1 (07 Mar 2020 18:54), Max: 39.2 (07 Mar 2020 15:11)  T(F): 98.7 (07 Mar 2020 18:54), Max: 102.6 (07 Mar 2020 15:11)  HR: 85 (07 Mar 2020 18:54) (85 - 112)  BP: 141/64 (07 Mar 2020 17:03) (130/61 - 141/64)  BP(mean): --  RR: 18 (07 Mar 2020 18:54) (16 - 18)  SpO2: 98% (07 Mar 2020 18:54) (97% - 98%)    PHYSICAL EXAM:    Constitutional: NAD, well-developed, awake/alert  HEENT: EOMI  Neck: no pain  Back: No CVA tenderness B/L  Respiratory: No accessory respiratory muscle use  Abd: Soft, NT/ND, bladder non palpable, no suprapubic tenderness  : + firm, no fluctuant, indurated area on the L lateral perineum extending to base of scrotum.  Extremities: no edema  Neurological: A/O x 3  Psychiatric: Normal mood, normal affect      I&O's Summary      LABS:                        15.5   18.14 )-----------( 329      ( 07 Mar 2020 16:29 )             45.3     03-07    137  |  99  |  15  ----------------------------<  116<H>  4.5   |  22  |  1.2    Ca    9.5      07 Mar 2020 16:29    TPro  7.7  /  Alb  4.7  /  TBili  0.4  /  DBili  x   /  AST  29  /  ALT  50<H>  /  AlkPhos  90  -07    PT/INR - ( 07 Mar 2020 21:07 )   PT: 14.60 sec;   INR: 1.27 ratio         PTT - ( 07 Mar 2020 21:07 )  PTT:31.7 sec  Urinalysis Basic - ( 07 Mar 2020 18:15 )    Color: Light Yellow / Appearance: Clear / S.023 / pH: x  Gluc: x / Ketone: Negative  / Bili: Negative / Urobili: <2 mg/dL   Blood: x / Protein: Trace / Nitrite: Negative   Leuk Esterase: Negative / RBC: 1 /HPF / WBC 1 /HPF   Sq Epi: x / Non Sq Epi: 0 /HPF / Bacteria: Negative            RADIOLOGY & ADDITIONAL STUDIES:  < from: CT Abdomen and Pelvis w/ IV Cont (20 @ 18:03) >    EXAM:  CT ABDOMEN AND PELVIS IC            PROCEDURE DATE:  2020            INTERPRETATION:  CLINICAL HISTORY/REASON FOR EXAM: Evaluate for perineal/scrotal abscess.    TECHNIQUE: Contiguous axial CT images were obtained from the lower chest to the pubic symphysis following administration of Optiray 320 intravenous contrast. Oral contrast was not administered. Reformatted images in the coronal and sagittal planes were acquired.    COMPARISON: CT abdomen pelvis 2020.      FINDINGS:    LOWER CHEST: Unremarkable.    HEPATOBILIARY: Unremarkable.    SPLEEN: Unremarkable.    PANCREAS: Unremarkable.    ADRENAL GLANDS: Unremarkable.    KIDNEYS: Symmetric pattern of renal enhancement. No hydronephrosis bilaterally. Previous seen noted obstructing right ureter calculus is no longer delineated.    ABDOMINOPELVIC NODES: No lymphadenopathy.    PELVIC ORGANS: Coarse calcifications in the prostate.    PERITONEUM/MESENTERY/BOWEL: No bowel obstruction. No ascites or pneumoperitoneum. Normal appendix.    BONES/SOFT TISSUES: There is a perineal fluid collection with rim enhancement measuring approximately 2.3 x 1.3 x 4 cm. There is extension of this abscess to the posterior aspect of the scrotum.    IMPRESSION:  Peritoneal abscess measuring approximately 2.3 x 1.3 x 4 cm with extension to the posterior aspect of the scrotum.                  JACQUE DAMON M.D., ATTENDING RADIOLOGIST  This document has been electronically signed. Mar  7 2020  6:19PM                < end of copied text >

## 2020-03-07 NOTE — ED PROVIDER NOTE - ATTENDING CONTRIBUTION TO CARE
38 yo m with pmh of abscesses, presents with c/o 1 week of genital/buttock pain, worse in last 3 days.  pt says pain now involves his scrotum.  +fever tmax 102.  pt says had abscess that needed i&d last year and he had other small ones after that resolved on its own. exam: nad, ncat, perrl, eomi, mmm, rrr, ctab, abd soft, nt,nd, +R perineal induration extending anteriorly to the base of the scrotum, aox3, imp: pt with r/o harvey's/abscess, sepsis, started on abx, CT a/p

## 2020-03-07 NOTE — ED PROVIDER NOTE - CLINICAL SUMMARY MEDICAL DECISION MAKING FREE TEXT BOX
40 yo M presented to ED for fever. Patient was found to have a perineal abscess. Patient admitted to medicine for IV antibiotics. Urology will follow for drainage.

## 2020-03-07 NOTE — ED ADULT NURSE NOTE - NS PRO AD NO ADVANCE DIRECTIVE
- Patient to remain NWB to LUE with splint in place  - Continue to monitor neurovasc status of LUE  - Pain control, as needed  - Continue work with PT, OT, PM&R while in house  - Regular diet / IVL  - DVT ppx with lovenox & b/l SCDs  - Incentive spirometer for pulm toileting  - Encourage ambulation  - Likely discharge home today
No

## 2020-03-07 NOTE — H&P ADULT - ASSESSMENT
39 years old male pt with past medical hx of kidney stones and DLD on diet control came to ER because of perineal pain and fever for 4 days.    # fever and perineal pain     ct scan showed perineal abscess with extension to scrotum     risk factor ??     will check hba1c, HIV     chlamydia and gonorrhoea     follow blood and urine culture     continue with IV clindamycin and cefepime     tylenol PRN for fever     morphine PRN for pain    # dld     on diet control     will check lipid profile    # DVT     on enoxaparin    # diet regular

## 2020-03-07 NOTE — ED PROVIDER NOTE - NS ED ROS FT
Constitutional: + fever, no recent weight loss, change in appetite or malaise  Cardiac: No chest pain, SOB or edema.  Respiratory: No cough or respiratory distress  GI: No nausea, vomiting, diarrhea or abdominal pain.  : No testicular pain/swelling. No dysuria, frequency, urgency or hematuria  MS: no pain to back or extremities, no loss of ROM, no weakness  Neuro: No headache or weakness. No LOC.  Skin: + perineal abscess  Endocrine: No history of thyroid disease or diabetes.  Except as documented in the HPI, all other systems are negative.

## 2020-03-07 NOTE — H&P ADULT - BIRTH SEX
Request for:   lisdexamfetamine (VYVANSE) 20 MG capsule Take 1 capsule by mouth every morning. Do not start before 2019.   Last prescribed: 1719   #30   Refills: 0  **This script is     Next: 19  No show(s)/pt cancel: 0  Last:  19    Verified dose against providers last note.    Per PDMP last filled 19 #30  PDMP review: Criteria met. Forwarded to covering Physician/RIK for approval/signature.          Male

## 2020-03-08 LAB
ALBUMIN SERPL ELPH-MCNC: 4 G/DL — SIGNIFICANT CHANGE UP (ref 3.5–5.2)
ALP SERPL-CCNC: 74 U/L — SIGNIFICANT CHANGE UP (ref 30–115)
ALT FLD-CCNC: 37 U/L — SIGNIFICANT CHANGE UP (ref 0–41)
ANION GAP SERPL CALC-SCNC: 13 MMOL/L — SIGNIFICANT CHANGE UP (ref 7–14)
AST SERPL-CCNC: 19 U/L — SIGNIFICANT CHANGE UP (ref 0–41)
BILIRUB SERPL-MCNC: 0.6 MG/DL — SIGNIFICANT CHANGE UP (ref 0.2–1.2)
BUN SERPL-MCNC: 13 MG/DL — SIGNIFICANT CHANGE UP (ref 10–20)
CALCIUM SERPL-MCNC: 8.5 MG/DL — SIGNIFICANT CHANGE UP (ref 8.5–10.1)
CHLORIDE SERPL-SCNC: 105 MMOL/L — SIGNIFICANT CHANGE UP (ref 98–110)
CO2 SERPL-SCNC: 21 MMOL/L — SIGNIFICANT CHANGE UP (ref 17–32)
CREAT SERPL-MCNC: 1.1 MG/DL — SIGNIFICANT CHANGE UP (ref 0.7–1.5)
GLUCOSE SERPL-MCNC: 126 MG/DL — HIGH (ref 70–99)
HCT VFR BLD CALC: 39.5 % — LOW (ref 42–52)
HGB BLD-MCNC: 13 G/DL — LOW (ref 14–18)
LACTATE SERPL-SCNC: 1.4 MMOL/L — SIGNIFICANT CHANGE UP (ref 0.7–2)
MAGNESIUM SERPL-MCNC: 1.7 MG/DL — LOW (ref 1.8–2.4)
MCHC RBC-ENTMCNC: 30.5 PG — SIGNIFICANT CHANGE UP (ref 27–31)
MCHC RBC-ENTMCNC: 32.9 G/DL — SIGNIFICANT CHANGE UP (ref 32–37)
MCV RBC AUTO: 92.7 FL — SIGNIFICANT CHANGE UP (ref 80–94)
NRBC # BLD: 0 /100 WBCS — SIGNIFICANT CHANGE UP (ref 0–0)
PLATELET # BLD AUTO: 274 K/UL — SIGNIFICANT CHANGE UP (ref 130–400)
POTASSIUM SERPL-MCNC: 3.8 MMOL/L — SIGNIFICANT CHANGE UP (ref 3.5–5)
POTASSIUM SERPL-SCNC: 3.8 MMOL/L — SIGNIFICANT CHANGE UP (ref 3.5–5)
PROT SERPL-MCNC: 6.3 G/DL — SIGNIFICANT CHANGE UP (ref 6–8)
RBC # BLD: 4.26 M/UL — LOW (ref 4.7–6.1)
RBC # FLD: 11.7 % — SIGNIFICANT CHANGE UP (ref 11.5–14.5)
SODIUM SERPL-SCNC: 139 MMOL/L — SIGNIFICANT CHANGE UP (ref 135–146)
WBC # BLD: 17.5 K/UL — HIGH (ref 4.8–10.8)
WBC # FLD AUTO: 17.5 K/UL — HIGH (ref 4.8–10.8)

## 2020-03-08 RX ORDER — MORPHINE SULFATE 50 MG/1
2 CAPSULE, EXTENDED RELEASE ORAL DAILY
Refills: 0 | Status: DISCONTINUED | OUTPATIENT
Start: 2020-03-08 | End: 2020-03-10

## 2020-03-08 RX ADMIN — CEFEPIME 100 MILLIGRAM(S): 1 INJECTION, POWDER, FOR SOLUTION INTRAMUSCULAR; INTRAVENOUS at 21:18

## 2020-03-08 RX ADMIN — Medication 650 MILLIGRAM(S): at 05:45

## 2020-03-08 RX ADMIN — MORPHINE SULFATE 2 MILLIGRAM(S): 50 CAPSULE, EXTENDED RELEASE ORAL at 16:36

## 2020-03-08 RX ADMIN — SODIUM CHLORIDE 1000 MILLILITER(S): 9 INJECTION INTRAMUSCULAR; INTRAVENOUS; SUBCUTANEOUS at 00:39

## 2020-03-08 RX ADMIN — MORPHINE SULFATE 2 MILLIGRAM(S): 50 CAPSULE, EXTENDED RELEASE ORAL at 04:12

## 2020-03-08 RX ADMIN — SODIUM CHLORIDE 75 MILLILITER(S): 9 INJECTION, SOLUTION INTRAVENOUS at 04:11

## 2020-03-08 RX ADMIN — MORPHINE SULFATE 2 MILLIGRAM(S): 50 CAPSULE, EXTENDED RELEASE ORAL at 20:36

## 2020-03-08 RX ADMIN — Medication 100 MILLIGRAM(S): at 13:54

## 2020-03-08 RX ADMIN — MORPHINE SULFATE 2 MILLIGRAM(S): 50 CAPSULE, EXTENDED RELEASE ORAL at 16:14

## 2020-03-08 RX ADMIN — CEFEPIME 100 MILLIGRAM(S): 1 INJECTION, POWDER, FOR SOLUTION INTRAMUSCULAR; INTRAVENOUS at 13:54

## 2020-03-08 RX ADMIN — Medication 100 MILLIGRAM(S): at 21:55

## 2020-03-08 RX ADMIN — Medication 100 MILLIGRAM(S): at 05:15

## 2020-03-08 RX ADMIN — SODIUM CHLORIDE 75 MILLILITER(S): 9 INJECTION, SOLUTION INTRAVENOUS at 00:40

## 2020-03-08 RX ADMIN — MORPHINE SULFATE 2 MILLIGRAM(S): 50 CAPSULE, EXTENDED RELEASE ORAL at 12:30

## 2020-03-08 RX ADMIN — ENOXAPARIN SODIUM 40 MILLIGRAM(S): 100 INJECTION SUBCUTANEOUS at 11:47

## 2020-03-08 RX ADMIN — CEFEPIME 100 MILLIGRAM(S): 1 INJECTION, POWDER, FOR SOLUTION INTRAMUSCULAR; INTRAVENOUS at 05:15

## 2020-03-08 RX ADMIN — MORPHINE SULFATE 2 MILLIGRAM(S): 50 CAPSULE, EXTENDED RELEASE ORAL at 04:55

## 2020-03-08 RX ADMIN — MORPHINE SULFATE 2 MILLIGRAM(S): 50 CAPSULE, EXTENDED RELEASE ORAL at 12:10

## 2020-03-08 NOTE — CHART NOTE - NSCHARTNOTEFT_GEN_A_CORE
Urology:  Perineum still indurated with 3 cm incision, packing removed, no evidence of active drainage. In sterile technique wound edges cleaned incision irrigated with NS, repacked with 1 inch iodoform closed with C/D/I dressing . scrotal support on.   Plan Please obtain scrotal US to f/u.  Cont. IV Abx   Keep scrotum elevated at all the time   Dressing changes daily, evening.

## 2020-03-08 NOTE — PROGRESS NOTE ADULT - ASSESSMENT
39 y.o M with Perineal abscess s/p I&D and  packing on  3/7/20      plan:  Daily dressing/packing changes   Cont. IV Abx   Analgesia prn x pain  F/U abscess cultures  F/U 11 AM labs   Please provide scrotal support  Elevate scrotum at all the time   will F/U 39 y.o M with Perineal abscess s/p I&D and  packing on  3/7/20      plan:  Daily dressing/packing changes   Cont. IV Abx   Analgesia prn x pain  F/U abscess cultures  F/U AM labs   Please provide scrotal support  Elevate scrotum at all the time   will F/U

## 2020-03-08 NOTE — PROGRESS NOTE ADULT - SUBJECTIVE AND OBJECTIVE BOX
39 y.o M s/p I&D and packing of perineal abscess on 3/7/20.  Pt. seen and examined at bedside in NAD, states pain well controlled with pain medications. T max 102.6 yesterday at 3 PM noted, currently T 99.0. Pt. reports fevers and chills. Denies N/V, SOB, CP, difficulty urinating.      Vital Signs Last 24 Hrs  T(C): 37.2 (08 Mar 2020 07:45), Max: 39.2 (07 Mar 2020 15:11)  T(F): 99 (08 Mar 2020 07:45), Max: 102.6 (07 Mar 2020 15:11)  HR: 93 (08 Mar 2020 04:45) (85 - 112)  BP: 126/55 (08 Mar 2020 04:45) (121/56 - 141/64)  RR: 18 (08 Mar 2020 04:45) (16 - 18)  SpO2: 95% (07 Mar 2020 23:32) (95% - 98%)    MEDICATIONS  (STANDING):  cefepime   IVPB 2000 milliGRAM(s) IV Intermittent every 8 hours  clindamycin IVPB 900 milliGRAM(s) IV Intermittent every 8 hours  enoxaparin Injectable 40 milliGRAM(s) SubCutaneous daily  lactated ringers. 1000 milliLiter(s) (75 mL/Hr) IV Continuous <Continuous>    MEDICATIONS  (PRN):  acetaminophen   Tablet .. 650 milliGRAM(s) Oral every 6 hours PRN Temp greater or equal to 38.5C (101.3F)  morphine  - Injectable 2 milliGRAM(s) IV Push every 4 hours PRN Severe Pain (7 - 10)    REVIEW OF SYSTEMS:    CONSTITUTIONAL:  + fevers or chills  HEENT: No visual changes  ENDO: No sweating  NECK: No pain or stiffness  MUSCULOSKELETAL: No back pain, no joint pain  RESPIRATORY: No shortness of breath  CARDIOVASCULAR: No chest pain  GASTROINTESTINAL: No abdominal or epigastric pain. No nausea, vomiting,  No diarrhea or constipation.   NEUROLOGICAL: No mental status changes  PSYCH: No depression, no mood changes  SKIN: No itching    PE:  Constitutional: NAD, well-developed, awake/alert  HEENT: EOMI  Neck: no pain  Back: No CVA tenderness B/L  Respiratory: No accessory respiratory muscle use  Abd: Soft, NT/ND, bladder non palpable, no suprapubic tenderness  : + firm, no fluctuant, indurated area on the L lateral perineum extending to base of scrotum s/p I&D, Packing in place, covered with fluff that soiled with serosanguineous. testis descended with ttp L>R, + cremasteric b/l. No crepitus palpated.   Extremities: no edema  Neurological: A/O x 3  Psychiatric: Normal mood, normal affect    LABS:                        15.5   18.14 )-----------( 329      ( 07 Mar 2020 16:29 )             45.3     03-07    137  |  99  |  15  ----------------------------<  116<H>  4.5   |  22  |  1.2    Ca    9.5      07 Mar 2020 16:29    TPro  7.7  /  Alb  4.7  /  TBili  0.4  /  DBili  x   /  AST  29  /  ALT  50<H>  /  AlkPhos  90  03-07     Urinalysis (03.07.20 @ 18:15)    pH Urine: 6.0    Glucose Qualitative, Urine: Negative    Blood, Urine: Moderate    Color: Light Yellow    Urine Appearance: Clear    Bilirubin: Negative    Ketone - Urine: Negative    Specific Gravity: 1.023    Protein, Urine: Trace    Urobilinogen: <2 mg/dL    Nitrite: Negative    Leukocyte Esterase Concentration: Negative    abscess cx- pending      RADIOLOGY & ADDITIONAL STUDIES:   < from: CT Abdomen and Pelvis w/ IV Cont (03.07.20 @ 18:03) >    EXAM:  CT ABDOMEN AND PELVIS IC            PROCEDURE DATE:  03/07/2020      INTERPRETATION:  CLINICAL HISTORY/REASON FOR EXAM: Evaluate for perineal/scrotal abscess.    TECHNIQUE: Contiguous axial CT images were obtained from the lower chest to the pubic symphysis following administration of Optiray 320 intravenous contrast. Oral contrast was not administered. Reformatted images in the coronal and sagittal planes were acquired.    COMPARISON: CT abdomen pelvis February 27, 2020.      FINDINGS:    LOWER CHEST: Unremarkable.    HEPATOBILIARY: Unremarkable.    SPLEEN: Unremarkable.    PANCREAS: Unremarkable.    ADRENAL GLANDS: Unremarkable.    KIDNEYS: Symmetric pattern of renal enhancement. No hydronephrosis bilaterally. Previous seen noted obstructing right ureter calculus is no longer delineated.    ABDOMINOPELVIC NODES: No lymphadenopathy.    PELVIC ORGANS: Coarse calcifications in the prostate.    PERITONEUM/MESENTERY/BOWEL: No bowel obstruction. No ascites or pneumoperitoneum. Normal appendix.    BONES/SOFT TISSUES: There is a perineal fluid collection with rim enhancement measuring approximately 2.3 x 1.3 x 4 cm. There is extension of this abscess to the posterior aspect of the scrotum.    IMPRESSION:  Peritoneal abscess measuring approximately 2.3 x 1.3 x 4 cm with extension to the posterior aspect of the scrotum.                  JACQUE DAMON M.D., ATTENDING RADIOLOGIST  This document has been electronically signed. Mar  7 2020  6:19PM    < end of copied text >

## 2020-03-09 LAB
ANION GAP SERPL CALC-SCNC: 13 MMOL/L — SIGNIFICANT CHANGE UP (ref 7–14)
BLD GP AB SCN SERPL QL: SIGNIFICANT CHANGE UP
BUN SERPL-MCNC: 13 MG/DL — SIGNIFICANT CHANGE UP (ref 10–20)
CALCIUM SERPL-MCNC: 9 MG/DL — SIGNIFICANT CHANGE UP (ref 8.5–10.1)
CHLORIDE SERPL-SCNC: 103 MMOL/L — SIGNIFICANT CHANGE UP (ref 98–110)
CO2 SERPL-SCNC: 24 MMOL/L — SIGNIFICANT CHANGE UP (ref 17–32)
CREAT SERPL-MCNC: 1.2 MG/DL — SIGNIFICANT CHANGE UP (ref 0.7–1.5)
CULTURE RESULTS: NO GROWTH — SIGNIFICANT CHANGE UP
ESTIMATED AVERAGE GLUCOSE: 117 MG/DL — HIGH (ref 68–114)
GLUCOSE SERPL-MCNC: 97 MG/DL — SIGNIFICANT CHANGE UP (ref 70–99)
HBA1C BLD-MCNC: 5.7 % — HIGH (ref 4–5.6)
HCT VFR BLD CALC: 40.5 % — LOW (ref 42–52)
HGB BLD-MCNC: 13.8 G/DL — LOW (ref 14–18)
HIV 1+2 AB+HIV1 P24 AG SERPL QL IA: SIGNIFICANT CHANGE UP
MAGNESIUM SERPL-MCNC: 2.2 MG/DL — SIGNIFICANT CHANGE UP (ref 1.8–2.4)
MCHC RBC-ENTMCNC: 31.9 PG — HIGH (ref 27–31)
MCHC RBC-ENTMCNC: 34.1 G/DL — SIGNIFICANT CHANGE UP (ref 32–37)
MCV RBC AUTO: 93.5 FL — SIGNIFICANT CHANGE UP (ref 80–94)
NRBC # BLD: 0 /100 WBCS — SIGNIFICANT CHANGE UP (ref 0–0)
PLATELET # BLD AUTO: 270 K/UL — SIGNIFICANT CHANGE UP (ref 130–400)
POTASSIUM SERPL-MCNC: 4.1 MMOL/L — SIGNIFICANT CHANGE UP (ref 3.5–5)
POTASSIUM SERPL-SCNC: 4.1 MMOL/L — SIGNIFICANT CHANGE UP (ref 3.5–5)
RBC # BLD: 4.33 M/UL — LOW (ref 4.7–6.1)
RBC # FLD: 11.6 % — SIGNIFICANT CHANGE UP (ref 11.5–14.5)
SODIUM SERPL-SCNC: 140 MMOL/L — SIGNIFICANT CHANGE UP (ref 135–146)
SPECIMEN SOURCE: SIGNIFICANT CHANGE UP
WBC # BLD: 13.19 K/UL — HIGH (ref 4.8–10.8)
WBC # FLD AUTO: 13.19 K/UL — HIGH (ref 4.8–10.8)

## 2020-03-09 PROCEDURE — 99233 SBSQ HOSP IP/OBS HIGH 50: CPT

## 2020-03-09 PROCEDURE — 99232 SBSQ HOSP IP/OBS MODERATE 35: CPT

## 2020-03-09 PROCEDURE — 93010 ELECTROCARDIOGRAM REPORT: CPT

## 2020-03-09 PROCEDURE — 71045 X-RAY EXAM CHEST 1 VIEW: CPT | Mod: 26

## 2020-03-09 PROCEDURE — 76870 US EXAM SCROTUM: CPT | Mod: 26

## 2020-03-09 RX ORDER — LINEZOLID 600 MG/300ML
600 INJECTION, SOLUTION INTRAVENOUS ONCE
Refills: 0 | Status: COMPLETED | OUTPATIENT
Start: 2020-03-09 | End: 2020-03-09

## 2020-03-09 RX ORDER — METRONIDAZOLE 500 MG
500 TABLET ORAL EVERY 8 HOURS
Refills: 0 | Status: DISCONTINUED | OUTPATIENT
Start: 2020-03-09 | End: 2020-03-10

## 2020-03-09 RX ORDER — LINEZOLID 600 MG/300ML
600 INJECTION, SOLUTION INTRAVENOUS EVERY 12 HOURS
Refills: 0 | Status: DISCONTINUED | OUTPATIENT
Start: 2020-03-10 | End: 2020-03-10

## 2020-03-09 RX ORDER — LINEZOLID 600 MG/300ML
INJECTION, SOLUTION INTRAVENOUS
Refills: 0 | Status: DISCONTINUED | OUTPATIENT
Start: 2020-03-09 | End: 2020-03-10

## 2020-03-09 RX ADMIN — CEFEPIME 100 MILLIGRAM(S): 1 INJECTION, POWDER, FOR SOLUTION INTRAMUSCULAR; INTRAVENOUS at 13:07

## 2020-03-09 RX ADMIN — Medication 100 MILLIGRAM(S): at 13:07

## 2020-03-09 RX ADMIN — MORPHINE SULFATE 2 MILLIGRAM(S): 50 CAPSULE, EXTENDED RELEASE ORAL at 10:40

## 2020-03-09 RX ADMIN — Medication 100 MILLIGRAM(S): at 05:42

## 2020-03-09 RX ADMIN — ENOXAPARIN SODIUM 40 MILLIGRAM(S): 100 INJECTION SUBCUTANEOUS at 11:20

## 2020-03-09 RX ADMIN — MORPHINE SULFATE 2 MILLIGRAM(S): 50 CAPSULE, EXTENDED RELEASE ORAL at 09:22

## 2020-03-09 RX ADMIN — Medication 100 MILLIGRAM(S): at 21:27

## 2020-03-09 RX ADMIN — CEFEPIME 100 MILLIGRAM(S): 1 INJECTION, POWDER, FOR SOLUTION INTRAMUSCULAR; INTRAVENOUS at 21:57

## 2020-03-09 RX ADMIN — Medication 100 MILLIGRAM(S): at 22:37

## 2020-03-09 RX ADMIN — CEFEPIME 100 MILLIGRAM(S): 1 INJECTION, POWDER, FOR SOLUTION INTRAMUSCULAR; INTRAVENOUS at 05:08

## 2020-03-09 RX ADMIN — LINEZOLID 300 MILLIGRAM(S): 600 INJECTION, SOLUTION INTRAVENOUS at 17:31

## 2020-03-09 NOTE — PROGRESS NOTE ADULT - ASSESSMENT
39 years old male pt with past medical hx of kidney stones and DLD on diet control came to ER because of perineal pain and fever for 4 days. Pt was found to have perineal abscess and had I&D done by uro on admission 3/7. Pt is in monogamous relationship.     # Perineal abscess, s/p I&D  - WBC trending down, no more high grade fever  - pt will need another I&D with burn, possible tonight  - keep NPO  - ID eval  - Cx from I&D negative, BCx negative   - cont IV ABx  - f/u HIV, Gc/chlam    # Metabolic lactic  acidosis due to active ongoing infection - lactate trending down     # Hepatic steatosis - weight loss advised    # Obesity - diet modification and exercise advised    DVT ppx    #Progress Note Handoff  Pending OR I&D by burn  Tests: Cx from I&D  Test Results: Hb and WBC  Family Discussion: not needed, pt agreed with plan of care  Future Disposition: home when stable

## 2020-03-09 NOTE — PROGRESS NOTE ADULT - ASSESSMENT
40 y/o m with a perianal abscess s/p I&D    A) recurrent perineal abscess    P) ID consult  Burn consult for further debridement  continue abx   wound care  d/w attending

## 2020-03-09 NOTE — PROGRESS NOTE ADULT - SUBJECTIVE AND OBJECTIVE BOX
FANNIE STEVENS 39y Male  MRN#: [id number]         SUBJECTIVE  Patient is a 39y old Male who presents with a chief complaint of perineal pain and fever for 3 days (08 Mar 2020 08:37)  Currently admitted to medicine with the primary diagnosis of Sepsis    Patient reports scrotal pain is better. he had a fever yesterday 101.3      OBJECTIVE  PAST MEDICAL & SURGICAL HISTORY  Kidney stone  Rectal abscess  High cholesterol  No significant past surgical history    ALLERGIES:  No Known Allergies    MEDICATIONS:  STANDING MEDICATIONS  cefepime   IVPB 2000 milliGRAM(s) IV Intermittent every 8 hours  clindamycin IVPB 900 milliGRAM(s) IV Intermittent every 8 hours  enoxaparin Injectable 40 milliGRAM(s) SubCutaneous daily  lactated ringers. 1000 milliLiter(s) IV Continuous <Continuous>    PRN MEDICATIONS  acetaminophen   Tablet .. 650 milliGRAM(s) Oral every 6 hours PRN  morphine  - Injectable 2 milliGRAM(s) IV Push every 4 hours PRN  morphine  - Injectable 2 milliGRAM(s) IV Push daily PRN      VITAL SIGNS: Last 24 Hours  T(C): 36.7 (09 Mar 2020 05:26), Max: 37.7 (08 Mar 2020 13:24)  T(F): 98 (09 Mar 2020 05:26), Max: 99.9 (08 Mar 2020 13:24)  HR: 76 (09 Mar 2020 05:26) (76 - 97)  BP: 114/53 (09 Mar 2020 05:26) (114/53 - 132/65)  BP(mean): --  RR: 18 (09 Mar 2020 05:26) (18 - 18)  SpO2: --    LABS:                        13.0   17.50 )-----------( 274      ( 08 Mar 2020 08:12 )             39.5     03-08    139  |  105  |  13  ----------------------------<  126<H>  3.8   |  21  |  1.1    Ca    8.5      08 Mar 2020 08:12  Mg     1.7     03-08    TPro  6.3  /  Alb  4.0  /  TBili  0.6  /  DBili  x   /  AST  19  /  ALT  37  /  AlkPhos  74  03-08    PT/INR - ( 07 Mar 2020 21:07 )   PT: 14.60 sec;   INR: 1.27 ratio         PTT - ( 07 Mar 2020 21:07 )  PTT:31.7 sec  Urinalysis Basic - ( 07 Mar 2020 18:15 )    Color: Light Yellow / Appearance: Clear / S.023 / pH: x  Gluc: x / Ketone: Negative  / Bili: Negative / Urobili: <2 mg/dL   Blood: x / Protein: Trace / Nitrite: Negative   Leuk Esterase: Negative / RBC: 1 /HPF / WBC 1 /HPF   Sq Epi: x / Non Sq Epi: 0 /HPF / Bacteria: Negative            Culture - Abscess with Gram Stain (collected 07 Mar 2020 22:38)  Source: .Abscess perineal abscess  Preliminary Report (09 Mar 2020 08:46):    No growth    Culture - Urine (collected 07 Mar 2020 18:15)  Source: .Urine Clean Catch (Midstream)  Final Report (09 Mar 2020 07:09):    No growth    Culture - Blood (collected 07 Mar 2020 16:27)  Source: .Blood Blood  Preliminary Report (09 Mar 2020 02:03):    No growth to date.    Culture - Blood (collected 07 Mar 2020 16:26)  Source: .Blood Blood  Preliminary Report (09 Mar 2020 02:03):    No growth to date.          RADIOLOGY:      PHYSICAL EXAM:  	GENERAL: NAD, speaks in full sentences, no signs of respiratory distress  	HEAD:  Atraumatic, Normocephalic  	EYES: EOMI, PERRLA,   	CHEST/LUNG: Clear to auscultation bilaterally; No wheeze; No crackles; No accessory muscles used  	HEART: Regular rate and rhythm; No murmurs;   	ABDOMEN: Soft, Nontender, Nondistended; Bowel sounds present; No guarding    : + firm, no fluctuant, indurated area on the L lateral perineum extending to base of scrotum s/p I&D, Packing in place, covered with fluff that soiled with serosanguineous. testis descended with ttp L>R, + cremasteric b/l. No crepitus palpated.   	EXTREMITIES:  2+ Peripheral Pulses, No cyanosis or edema    PSYCH: AAOx3        ASSESSMENT & PLAN  39 years old male pt with past medical hx of kidney stones and DLD on diet control came to ER because of perineal pain and fever for 4 days, found to have scrotal abscess s/p I&D    # Scrotal abscess     ct scan showed perineal abscess with extension to scrotum     seen by Urology s/p I&D on 3/7. packing placed. keep scrotum elevated. cultures taken --> fu abscess cultures     do US testicles     follow blood and urine culture --> negative     wbc elevated     continue with IV clindamycin and cefepime     tylenol PRN for fever     morphine PRN for pain     hydration LR at 75/hr     fu HBA1C and HIV      # dld     on diet control     will check lipid profile    # DVT     on enoxaparin    # diet regular

## 2020-03-09 NOTE — CONSULT NOTE ADULT - GENITOURINARY COMMENTS
prineum packed with area of induration reaching upto the base of the scrotum. No crepitans. No scrotal/testicular  pain

## 2020-03-09 NOTE — CONSULT NOTE ADULT - ASSESSMENT
IMPRESSION:  # Recurrent perineal abscess with cellulitis  -no scrotal abscess or harvey's gangrene  -no scrotal wall cellulitis  -no epididymoorchitis  -s/p debridement of abscess 3/7  -BCx 3/7 NG  -abscess cultures 3/7 NG  -UCx 3/7 NG    RECOMMENDATIONS:  -will need further debridement  -Zyvox 600 mg iv q12h  -cefepime 2 gm iv q8h  -flagyl 500 mg iv q8h  -f/u with Sx

## 2020-03-09 NOTE — CONSULT NOTE ADULT - ASSESSMENT
perineal abscess s/p I&D pod#3    Plan:  -Continue packing  -Plan for OR 3/9  -preop labs  -NPO after midnight  -Fluids after midnight Perineal abscess s/p I&D POD#3  Decreased leukocytosis   Would benefit from wider incision and debridement     Plan:  -Continue packing  -Plan for OR in am 3/10  -preop labs  -NPO after midnight  -Fluids after midnight

## 2020-03-09 NOTE — PROGRESS NOTE ADULT - SUBJECTIVE AND OBJECTIVE BOX
Progress Note    Subjective  40 y/o Male with right perineal abscess. Pt up ambulating in   NAD. Fever yesterday to 101.3.    [x] a 10 point review of systems was negative except where noted    [  ]  Due to altered mental status/intubation, subjective information was not able t be obtained from the patient.  History was obtained, to the extent possible, from review of the chart and collateral sources of information.    Vital signs  T(C): , Max: 37.7 (03-08-20 @ 13:24)  HR: 76 (03-09-20 @ 05:26)  BP: 114/53 (03-09-20 @ 05:26)    Gen NC/AT in NAD  Neck supple, FROM  Abd   CVAT, soft non tender, bladder not palpable   voiding freely.  Scrotal support stained, Packing removed foul smelling drainage from incision.  Unable to express discharge, very TTP. Thick purulent bloody drainage.  Packing 1 inch replaced.     Labs                        13.8   13.19 )-----------( 270      ( 09 Mar 2020 07:30 )             40.5     08 Mar 2020 08:12    139    |  105    |  13     ----------------------------<  126    3.8     |  21     |  1.1      Ca    8.5        08 Mar 2020 08:12  Mg     1.7       08 Mar 2020 08:12

## 2020-03-09 NOTE — PROGRESS NOTE ADULT - SUBJECTIVE AND OBJECTIVE BOX
FANNIE STEVENS    Patient is a 39y old  Male who presents with a chief complaint of perineal pain and fever for 3 days (09 Mar 2020 09:34)    INTERVAL HPI/OVERNIGHT EVENTS: pt c/o excruciating perineal pain while the dressing was changed. Otherwise no other complaints. Low grade fever, T max 99.9 yesterday.    ROS: All ROS negative except as documented above.     PHYSICAL EXAM:  T(C): 37.3, Max: 37.6 (- @ 19:39)  HR: 11 (11 - 97)  BP: 122/71 (114/53 - 132/65)  RR: 18 (18 - 18)  SpO2: --    GENERAL: NAD  NECK: Supple, No JVD  PULMONARY/CHEST: No rales, rhonchi, wheezing  CARDIOVASC: Regular rate and rhythm; No murmurs  GI/ABDOMEN: Soft, Nontender, Nondistended; Bowel sounds present  EXTREMITIES:  2+ Peripheral Pulses, No clubbing, cyanosis, or edema, no deformity. No calf tenderness b/l.  SKIN: perineal abscess with thick foul smelling discharge  NERVOUS SYSTEM:  Alert & Oriented X3, no focal deficit     Care Discussed with burn dr. Li    LABS:                        13.8   13.19 )-----------( 270      ( 09 Mar 2020 07:30 )             40.5   WBC Count: 13.19 K/uL ()  WBC Count: 17.50 K/uL ()  WBC Count: 18.14 K/uL ()        140  |  103  |  13  ----------------------------<  97  4.1   |  24  |  1.2    Ca    9.0      09 Mar 2020 07:30  Mg     2.2         TPro  6.3  /  Alb  4.0  /  TBili  0.6  /  DBili  x   /  AST  19  /  ALT  37  /  AlkPhos  74      PT/INR - ( 07 Mar 2020 21:07 )   PT: 14.60 sec;   INR: 1.27 ratio         PTT - ( 07 Mar 2020 21:07 )  PTT:31.7 sec  Urinalysis Basic - ( 07 Mar 2020 18:15 )    Color: Light Yellow / Appearance: Clear / S.023 / pH: x  Gluc: x / Ketone: Negative  / Bili: Negative / Urobili: <2 mg/dL   Blood: x / Protein: Trace / Nitrite: Negative   Leuk Esterase: Negative / RBC: 1 /HPF / WBC 1 /HPF   Sq Epi: x / Non Sq Epi: 0 /HPF / Bacteria: Negative      Culture - Abscess with Gram Stain (collected 07 Mar 2020 22:38)  Source: .Abscess perineal abscess  Preliminary Report (09 Mar 2020 08:46):    No growth    Culture - Urine (collected 07 Mar 2020 18:15)  Source: .Urine Clean Catch (Midstream)  Final Report (09 Mar 2020 07:09):    No growth    Culture - Blood (collected 07 Mar 2020 16:27)  Source: .Blood Blood  Preliminary Report (09 Mar 2020 02:03):    No growth to date.    Culture - Blood (collected 07 Mar 2020 16:26)  Source: .Blood Blood  Preliminary Report (09 Mar 2020 02:03):    No growth to date.      MEDICATIONS  (STANDING):  cefepime   IVPB 2000 milliGRAM(s) IV Intermittent every 8 hours  clindamycin IVPB 900 milliGRAM(s) IV Intermittent every 8 hours  enoxaparin Injectable 40 milliGRAM(s) SubCutaneous daily  lactated ringers. 1000 milliLiter(s) (75 mL/Hr) IV Continuous <Continuous>    MEDICATIONS  (PRN):  acetaminophen   Tablet .. 650 milliGRAM(s) Oral every 6 hours PRN Temp greater or equal to 38.5C (101.3F)  morphine  - Injectable 2 milliGRAM(s) IV Push every 4 hours PRN Severe Pain (7 - 10)  morphine  - Injectable 2 milliGRAM(s) IV Push daily PRN Severe Pain (7 - 10)

## 2020-03-09 NOTE — CONSULT NOTE ADULT - SUBJECTIVE AND OBJECTIVE BOX
REASON FOR CONSULT: perineal abscess s/p I&D pod #3    CONSULT REQUESTED BY: primary team    Patient is a 39y old  Male who presents with a chief complaint of perineal pain and fever for 3 days (09 Mar 2020 13:27)      Events last 24 hours:  febrile 101.3 yesterday    PAST MEDICAL & SURGICAL HISTORY:  Kidney stone  Rectal abscess  High cholesterol  No significant past surgical history    Allergies    No Known Allergies    Intolerances      FAMILY HISTORY:  FH: hypertension      Review of Systems:  CONSTITUTIONAL: No fever, chills, or fatigue  EYES: No eye pain, visual disturbances, or discharge  ENMT:  No difficulty hearing, tinnitus, vertigo; No sinus or throat pain  NECK: No pain or stiffness  RESPIRATORY: No cough, wheezing, chills or hemoptysis; No shortness of breath  CARDIOVASCULAR: No chest pain, palpitations, dizziness, or leg swelling  GASTROINTESTINAL: No abdominal or epigastric pain. No nausea, vomiting, or hematemesis; No diarrhea or constipation. No melena or hematochezia.  GENITOURINARY: No dysuria, frequency, hematuria, or incontinence  NEUROLOGICAL: No headaches, memory loss, loss of strength, numbness, or tremors  SKIN: No itching, burning, rashes, or lesions   MUSCULOSKELETAL: No joint pain or swelling; No muscle, back, or extremity pain  PSYCHIATRIC: No depression, anxiety, mood swings, or difficulty sleeping      Medications:  cefepime   IVPB 2000 milliGRAM(s) IV Intermittent every 8 hours  clindamycin IVPB 900 milliGRAM(s) IV Intermittent every 8 hours        acetaminophen   Tablet .. 650 milliGRAM(s) Oral every 6 hours PRN  morphine  - Injectable 2 milliGRAM(s) IV Push every 4 hours PRN  morphine  - Injectable 2 milliGRAM(s) IV Push daily PRN      enoxaparin Injectable 40 milliGRAM(s) SubCutaneous daily          lactated ringers. 1000 milliLiter(s) IV Continuous <Continuous>                ICU Vital Signs Last 24 Hrs  T(C): 37.3 (09 Mar 2020 12:12), Max: 37.6 (08 Mar 2020 19:39)  T(F): 99.1 (09 Mar 2020 12:12), Max: 99.7 (08 Mar 2020 19:39)  HR: 11 (09 Mar 2020 12:12) (11 - 97)  BP: 122/71 (09 Mar 2020 12:12) (114/53 - 132/65)  BP(mean): --  ABP: --  ABP(mean): --  RR: 18 (09 Mar 2020 12:12) (18 - 18)  SpO2: --    Vital Signs Last 24 Hrs  T(C): 37.3 (09 Mar 2020 12:12), Max: 37.6 (08 Mar 2020 19:39)  T(F): 99.1 (09 Mar 2020 12:12), Max: 99.7 (08 Mar 2020 19:39)  HR: 11 (09 Mar 2020 12:12) (11 - 97)  BP: 122/71 (09 Mar 2020 12:12) (114/53 - 132/65)  BP(mean): --  RR: 18 (09 Mar 2020 12:12) (18 - 18)  SpO2: --        I&O's Detail    09 Mar 2020 07:01  -  09 Mar 2020 14:21  --------------------------------------------------------  IN:    Oral Fluid: 118 mL  Total IN: 118 mL    OUT:  Total OUT: 0 mL    Total NET: 118 mL            LABS:                        13.8   13.19 )-----------( 270      ( 09 Mar 2020 07:30 )             40.5     03-09    140  |  103  |  13  ----------------------------<  97  4.1   |  24  |  1.2    Ca    9.0      09 Mar 2020 07:30  Mg     2.2     03-09    TPro  6.3  /  Alb  4.0  /  TBili  0.6  /  DBili  x   /  AST  19  /  ALT  37  /  AlkPhos  74  03-08          CAPILLARY BLOOD GLUCOSE        PT/INR - ( 07 Mar 2020 21:07 )   PT: 14.60 sec;   INR: 1.27 ratio         PTT - ( 07 Mar 2020 21:07 )  PTT:31.7 sec  Urinalysis Basic - ( 07 Mar 2020 18:15 )    Color: Light Yellow / Appearance: Clear / S.023 / pH: x  Gluc: x / Ketone: Negative  / Bili: Negative / Urobili: <2 mg/dL   Blood: x / Protein: Trace / Nitrite: Negative   Leuk Esterase: Negative / RBC: 1 /HPF / WBC 1 /HPF   Sq Epi: x / Non Sq Epi: 0 /HPF / Bacteria: Negative      CULTURES:  Culture Results:   No growth ( @ 22:38)  Culture Results:   No growth ( @ 18:15)  Culture Results:   No growth to date. ( @ 16:27)  Culture Results:   No growth to date. ( @ 16:26)      Exam: ___________________________ REASON FOR CONSULT: perineal abscess s/p I&D in ED by Urology pod #3    CONSULT REQUESTED BY: primary team    Patient is a 39y old  Male who presents with a chief complaint of perineal pain and fever for 3 days (09 Mar 2020 13:27)  Pt reports that he had similar issue in the past treated with simple I& D however symptoms are significantly worse at this presentation     Events last 24 hours:  febrile 101.3 yesterday    PAST MEDICAL & SURGICAL HISTORY:  Kidney stone  Rectal abscess  High cholesterol  No significant past surgical history    Allergies    No Known Allergies    Intolerances      FAMILY HISTORY:  FH: hypertension      Review of Systems:  CONSTITUTIONAL: No fever, chills, or fatigue  EYES: No eye pain, visual disturbances, or discharge  ENMT:  No difficulty hearing, tinnitus, vertigo; No sinus or throat pain  NECK: No pain or stiffness  RESPIRATORY: No cough, wheezing, chills or hemoptysis; No shortness of breath  CARDIOVASCULAR: No chest pain, palpitations, dizziness, or leg swelling  GASTROINTESTINAL: No abdominal or epigastric pain. No nausea, vomiting, or hematemesis; No diarrhea or constipation. No melena or hematochezia.  GENITOURINARY: No dysuria, frequency, hematuria, or incontinence  NEUROLOGICAL: No headaches, memory loss, loss of strength, numbness, or tremors  SKIN: No itching, burning, rashes, or lesions   MUSCULOSKELETAL: No joint pain or swelling; No muscle, back, or extremity pain  PSYCHIATRIC: No depression, anxiety, mood swings, or difficulty sleeping      Medications:  cefepime   IVPB 2000 milliGRAM(s) IV Intermittent every 8 hours  clindamycin IVPB 900 milliGRAM(s) IV Intermittent every 8 hours  acetaminophen   Tablet .. 650 milliGRAM(s) Oral every 6 hours PRN  morphine  - Injectable 2 milliGRAM(s) IV Push every 4 hours PRN  morphine  - Injectable 2 milliGRAM(s) IV Push daily PRN  enoxaparin Injectable 40 milliGRAM(s) SubCutaneous daily  lactated ringers. 1000 milliLiter(s) IV Continuous <Continuous>    ICU Vital Signs Last 24 Hrs  T(C): 37.3 (09 Mar 2020 12:12), Max: 37.6 (08 Mar 2020 19:39)  T(F): 99.1 (09 Mar 2020 12:12), Max: 99.7 (08 Mar 2020 19:39)  HR: 11 (09 Mar 2020 12:12) (11 - 97)  BP: 122/71 (09 Mar 2020 12:12) (114/53 - 132/65)    I&O's Detail    09 Mar 2020 07:01  -  09 Mar 2020 14:21  --------------------------------------------------------  IN:    Oral Fluid: 118 mL  Total IN: 118 mL    OUT:  Total OUT: 0 mL    Total NET: 118 mL    LABS:                        13.8   13.19 )-----------( 270      ( 09 Mar 2020 07:30 )             40.5     03-09    140  |  103  |  13  ----------------------------<  97  4.1   |  24  |  1.2    Ca    9.0      09 Mar 2020 07:30  Mg     2.2     -09    TPro  6.3  /  Alb  4.0  /  TBili  0.6  /  DBili  x   /  AST  19  /  ALT  37  /  AlkPhos  74  03-08    PT/INR - ( 07 Mar 2020 21:07 )   PT: 14.60 sec;   INR: 1.27 ratio  /PTT - ( 07 Mar 2020 21:07 )  PTT:31.7 sec    Urinalysis Basic - ( 07 Mar 2020 18:15 )    Color: Light Yellow / Appearance: Clear / S.023 / pH: x  Gluc: x / Ketone: Negative  / Bili: Negative / Urobili: <2 mg/dL   Blood: x / Protein: Trace / Nitrite: Negative   Leuk Esterase: Negative / RBC: 1 /HPF / WBC 1 /HPF   Sq Epi: x / Non Sq Epi: 0 /HPF / Bacteria: Negative      CULTURES:  Culture Results:   No growth ( @ 22:38)  Culture Results:   No growth ( @ 18:15)  Culture Results:   No growth to date. ( @ 16:27)  Culture Results:   No growth to date. ( @ 16:26)    EXAM:   Perineum / base of scrotum - swollen tender indurated ; open wound with frankly necrotic subcutaneous tissue and foul odor     wound irrigated and packed

## 2020-03-09 NOTE — CONSULT NOTE ADULT - SUBJECTIVE AND OBJECTIVE BOX
RODNEY FANNIE  39y, Male  Allergy: No Known Allergies      All historical available data reviewed.    HPI:  39 years old male pt with past medical hx of kidney stones and DLD on diet control came to ER because of perineal pain and fever for 4 days.  As per patient for last 4 days, he is having pain in his perineal area, he feels a swelling that extends till the base of his scrotum, associated with fever, he denies any urethral or anal discharge.  He has h/o of perineal abscess almost a year ago in 2019, s/p I and D, after that his pain never resolves, he had flar every month but it was controlled for last 6 months he takes good hygiene measure, He doesnt follow with any urologist  he is , monogamous relation with her wife only, was tested for HIV and DM in the past.  He denies any GI symptoms no h/o of eye redness, no back pain ( no symptoms for IBD).  While in ER, he was febrile to 102, tachycardic, ct scan showed   Peritoneal abscess measuring approximately 2.3 x 1.3 x 4 cm with extension to the posterior aspect of the scrotum. (07 Mar 2020 23:19)    ID called for ABx    FAMILY HISTORY:  FH: hypertension    PAST MEDICAL & SURGICAL HISTORY:  Kidney stone  Rectal abscess  High cholesterol  No significant past surgical history        VITALS:  T(F): 99.1, Max: 99.7 (20 @ 19:39)  HR: 91  BP: 122/71  RR: 18Vital Signs Last 24 Hrs  T(C): 37.3 (09 Mar 2020 12:12), Max: 37.6 (08 Mar 2020 19:39)  T(F): 99.1 (09 Mar 2020 12:12), Max: 99.7 (08 Mar 2020 19:39)  HR: 91 (09 Mar 2020 14:30) (69 - 97)  BP: 122/71 (09 Mar 2020 12:12) (114/53 - 132/65)  BP(mean): --  RR: 18 (09 Mar 2020 12:12) (18 - 18)  SpO2: --    TESTS & MEASUREMENTS:                        13.8   13.19 )-----------( 270      ( 09 Mar 2020 07:30 )             40.5     03-09    140  |  103  |  13  ----------------------------<  97  4.1   |  24  |  1.2    Ca    9.0      09 Mar 2020 07:30  Mg     2.2         TPro  6.3  /  Alb  4.0  /  TBili  0.6  /  DBili  x   /  AST  19  /  ALT  37  /  AlkPhos  74  03-08    LIVER FUNCTIONS - ( 08 Mar 2020 08:12 )  Alb: 4.0 g/dL / Pro: 6.3 g/dL / ALK PHOS: 74 U/L / ALT: 37 U/L / AST: 19 U/L / GGT: x             Culture - Abscess with Gram Stain (collected 20 @ 22:38)  Source: .Abscess perineal abscess  Preliminary Report (20 @ 08:46):    No growth    Culture - Urine (collected 20 @ 18:15)  Source: .Urine Clean Catch (Midstream)  Final Report (20 @ 07:09):    No growth    Culture - Blood (collected 20 @ 16:27)  Source: .Blood Blood  Preliminary Report (20 @ 02:03):    No growth to date.    Culture - Blood (collected 20 @ 16:26)  Source: .Blood Blood  Preliminary Report (20 @ 02:03):    No growth to date.      Urinalysis Basic - ( 07 Mar 2020 18:15 )    Color: Light Yellow / Appearance: Clear / S.023 / pH: x  Gluc: x / Ketone: Negative  / Bili: Negative / Urobili: <2 mg/dL   Blood: x / Protein: Trace / Nitrite: Negative   Leuk Esterase: Negative / RBC: 1 /HPF / WBC 1 /HPF   Sq Epi: x / Non Sq Epi: 0 /HPF / Bacteria: Negative          RADIOLOGY & ADDITIONAL TESTS:  Personal review of radiological diagnostics performed  Echo and EKG results noted when applicable.     MEDICATIONS:  acetaminophen   Tablet .. 650 milliGRAM(s) Oral every 6 hours PRN  cefepime   IVPB 2000 milliGRAM(s) IV Intermittent every 8 hours  clindamycin IVPB 900 milliGRAM(s) IV Intermittent every 8 hours  enoxaparin Injectable 40 milliGRAM(s) SubCutaneous daily  lactated ringers. 1000 milliLiter(s) IV Continuous <Continuous>  morphine  - Injectable 2 milliGRAM(s) IV Push every 4 hours PRN  morphine  - Injectable 2 milliGRAM(s) IV Push daily PRN      ANTIBIOTICS:  cefepime   IVPB 2000 milliGRAM(s) IV Intermittent every 8 hours  clindamycin IVPB 900 milliGRAM(s) IV Intermittent every 8 hours

## 2020-03-10 ENCOUNTER — RESULT REVIEW (OUTPATIENT)
Age: 40
End: 2020-03-10

## 2020-03-10 PROCEDURE — 99233 SBSQ HOSP IP/OBS HIGH 50: CPT

## 2020-03-10 PROCEDURE — 10060 I&D ABSCESS SIMPLE/SINGLE: CPT | Mod: 59

## 2020-03-10 PROCEDURE — 88304 TISSUE EXAM BY PATHOLOGIST: CPT | Mod: 26

## 2020-03-10 PROCEDURE — 11046 DBRDMT MUSC&/FSCA EA ADDL: CPT

## 2020-03-10 PROCEDURE — 11043 DBRDMT MUSC&/FSCA 1ST 20/<: CPT

## 2020-03-10 RX ORDER — ACETAMINOPHEN 500 MG
650 TABLET ORAL EVERY 6 HOURS
Refills: 0 | Status: DISCONTINUED | OUTPATIENT
Start: 2020-03-10 | End: 2020-03-11

## 2020-03-10 RX ORDER — CEFEPIME 1 G/1
2000 INJECTION, POWDER, FOR SOLUTION INTRAMUSCULAR; INTRAVENOUS EVERY 8 HOURS
Refills: 0 | Status: DISCONTINUED | OUTPATIENT
Start: 2020-03-10 | End: 2020-03-11

## 2020-03-10 RX ORDER — LINEZOLID 600 MG/300ML
600 INJECTION, SOLUTION INTRAVENOUS EVERY 12 HOURS
Refills: 0 | Status: DISCONTINUED | OUTPATIENT
Start: 2020-03-10 | End: 2020-03-11

## 2020-03-10 RX ORDER — LINEZOLID 600 MG/300ML
600 INJECTION, SOLUTION INTRAVENOUS EVERY 12 HOURS
Refills: 0 | Status: DISCONTINUED | OUTPATIENT
Start: 2020-03-10 | End: 2020-03-10

## 2020-03-10 RX ORDER — SODIUM CHLORIDE 9 MG/ML
1000 INJECTION, SOLUTION INTRAVENOUS
Refills: 0 | Status: DISCONTINUED | OUTPATIENT
Start: 2020-03-10 | End: 2020-03-11

## 2020-03-10 RX ORDER — METRONIDAZOLE 500 MG
500 TABLET ORAL EVERY 8 HOURS
Refills: 0 | Status: DISCONTINUED | OUTPATIENT
Start: 2020-03-10 | End: 2020-03-11

## 2020-03-10 RX ORDER — HYDROMORPHONE HYDROCHLORIDE 2 MG/ML
1 INJECTION INTRAMUSCULAR; INTRAVENOUS; SUBCUTANEOUS
Refills: 0 | Status: DISCONTINUED | OUTPATIENT
Start: 2020-03-10 | End: 2020-03-10

## 2020-03-10 RX ORDER — SODIUM CHLORIDE 9 MG/ML
1000 INJECTION, SOLUTION INTRAVENOUS
Refills: 0 | Status: DISCONTINUED | OUTPATIENT
Start: 2020-03-10 | End: 2020-03-10

## 2020-03-10 RX ORDER — LINEZOLID 600 MG/300ML
600 INJECTION, SOLUTION INTRAVENOUS ONCE
Refills: 0 | Status: COMPLETED | OUTPATIENT
Start: 2020-03-10 | End: 2020-03-10

## 2020-03-10 RX ORDER — ONDANSETRON 8 MG/1
4 TABLET, FILM COATED ORAL ONCE
Refills: 0 | Status: DISCONTINUED | OUTPATIENT
Start: 2020-03-10 | End: 2020-03-10

## 2020-03-10 RX ORDER — ENOXAPARIN SODIUM 100 MG/ML
40 INJECTION SUBCUTANEOUS DAILY
Refills: 0 | Status: DISCONTINUED | OUTPATIENT
Start: 2020-03-10 | End: 2020-03-11

## 2020-03-10 RX ORDER — MORPHINE SULFATE 50 MG/1
2 CAPSULE, EXTENDED RELEASE ORAL EVERY 4 HOURS
Refills: 0 | Status: DISCONTINUED | OUTPATIENT
Start: 2020-03-10 | End: 2020-03-10

## 2020-03-10 RX ORDER — HYDROMORPHONE HYDROCHLORIDE 2 MG/ML
0.5 INJECTION INTRAMUSCULAR; INTRAVENOUS; SUBCUTANEOUS
Refills: 0 | Status: DISCONTINUED | OUTPATIENT
Start: 2020-03-10 | End: 2020-03-10

## 2020-03-10 RX ORDER — KETOROLAC TROMETHAMINE 30 MG/ML
30 SYRINGE (ML) INJECTION EVERY 6 HOURS
Refills: 0 | Status: DISCONTINUED | OUTPATIENT
Start: 2020-03-10 | End: 2020-03-11

## 2020-03-10 RX ORDER — LINEZOLID 600 MG/300ML
INJECTION, SOLUTION INTRAVENOUS
Refills: 0 | Status: DISCONTINUED | OUTPATIENT
Start: 2020-03-10 | End: 2020-03-10

## 2020-03-10 RX ORDER — MORPHINE SULFATE 50 MG/1
2 CAPSULE, EXTENDED RELEASE ORAL DAILY
Refills: 0 | Status: DISCONTINUED | OUTPATIENT
Start: 2020-03-10 | End: 2020-03-10

## 2020-03-10 RX ADMIN — CEFEPIME 100 MILLIGRAM(S): 1 INJECTION, POWDER, FOR SOLUTION INTRAMUSCULAR; INTRAVENOUS at 16:26

## 2020-03-10 RX ADMIN — Medication 100 MILLIGRAM(S): at 05:26

## 2020-03-10 RX ADMIN — Medication 100 MILLIGRAM(S): at 17:08

## 2020-03-10 RX ADMIN — SODIUM CHLORIDE 125 MILLILITER(S): 9 INJECTION, SOLUTION INTRAVENOUS at 09:40

## 2020-03-10 RX ADMIN — Medication 100 MILLIGRAM(S): at 05:00

## 2020-03-10 RX ADMIN — LINEZOLID 300 MILLIGRAM(S): 600 INJECTION, SOLUTION INTRAVENOUS at 18:44

## 2020-03-10 RX ADMIN — Medication 30 MILLIGRAM(S): at 11:16

## 2020-03-10 RX ADMIN — SODIUM CHLORIDE 100 MILLILITER(S): 9 INJECTION, SOLUTION INTRAVENOUS at 09:10

## 2020-03-10 RX ADMIN — CEFEPIME 100 MILLIGRAM(S): 1 INJECTION, POWDER, FOR SOLUTION INTRAMUSCULAR; INTRAVENOUS at 06:30

## 2020-03-10 RX ADMIN — Medication 100 MILLIGRAM(S): at 22:53

## 2020-03-10 RX ADMIN — CEFEPIME 100 MILLIGRAM(S): 1 INJECTION, POWDER, FOR SOLUTION INTRAMUSCULAR; INTRAVENOUS at 21:58

## 2020-03-10 RX ADMIN — SODIUM CHLORIDE 75 MILLILITER(S): 9 INJECTION, SOLUTION INTRAVENOUS at 04:26

## 2020-03-10 RX ADMIN — LINEZOLID 300 MILLIGRAM(S): 600 INJECTION, SOLUTION INTRAVENOUS at 06:29

## 2020-03-10 RX ADMIN — ENOXAPARIN SODIUM 40 MILLIGRAM(S): 100 INJECTION SUBCUTANEOUS at 11:17

## 2020-03-10 RX ADMIN — Medication 30 MILLIGRAM(S): at 18:44

## 2020-03-10 NOTE — PROGRESS NOTE ADULT - SUBJECTIVE AND OBJECTIVE BOX
FANNIE STEVENS  39y, Male    All available historical data reviewed    OVERNIGHT EVENTS:  s/p debridement by Dr Ballesteros 3/10. Purulent drainage and necrotic tissue    ROS:      VITALS:  T(F): 96.9, Max: 99.1 (03-09-20 @ 12:12)  HR: 72  BP: 121/55  RR: 18Vital Signs Last 24 Hrs  T(C): 36.1 (10 Mar 2020 10:32), Max: 37.3 (09 Mar 2020 12:12)  T(F): 96.9 (10 Mar 2020 10:32), Max: 99.1 (09 Mar 2020 12:12)  HR: 72 (10 Mar 2020 10:32) (62 - 91)  BP: 121/55 (10 Mar 2020 10:32) (96/55 - 133/67)  BP(mean): --  RR: 18 (10 Mar 2020 10:32) (10 - 18)  SpO2: 95% (10 Mar 2020 10:00) (95% - 100%)    TESTS & MEASUREMENTS:                        13.8   13.19 )-----------( 270      ( 09 Mar 2020 07:30 )             40.5     03-09    140  |  103  |  13  ----------------------------<  97  4.1   |  24  |  1.2    Ca    9.0      09 Mar 2020 07:30  Mg     2.2     03-09          Culture - Blood (collected 03-08-20 @ 08:12)  Source: .Blood None  Preliminary Report (03-09-20 @ 18:02):    No growth to date.    Culture - Abscess with Gram Stain (collected 03-07-20 @ 22:38)  Source: .Abscess perineal abscess  Preliminary Report (03-09-20 @ 08:46):    No growth    Culture - Urine (collected 03-07-20 @ 18:15)  Source: .Urine Clean Catch (Midstream)  Final Report (03-09-20 @ 07:09):    No growth    Culture - Blood (collected 03-07-20 @ 16:27)  Source: .Blood Blood  Preliminary Report (03-09-20 @ 02:03):    No growth to date.    Culture - Blood (collected 03-07-20 @ 16:26)  Source: .Blood Blood  Preliminary Report (03-09-20 @ 02:03):    No growth to date.            RADIOLOGY & ADDITIONAL TESTS:  Personal review of radiological diagnostics performed  Echo and EKG results noted when applicable.     MEDICATIONS:  acetaminophen   Tablet .. 650 milliGRAM(s) Oral every 6 hours PRN  cefepime   IVPB 2000 milliGRAM(s) IV Intermittent every 8 hours  enoxaparin Injectable 40 milliGRAM(s) SubCutaneous daily  HYDROmorphone  Injectable 0.5 milliGRAM(s) IV Push every 10 minutes PRN  HYDROmorphone  Injectable 1 milliGRAM(s) IV Push every 10 minutes PRN  ketorolac   Injectable 30 milliGRAM(s) IV Push every 6 hours  lactated ringers. 1000 milliLiter(s) IV Continuous <Continuous>  linezolid  IVPB 600 milliGRAM(s) IV Intermittent every 12 hours  metroNIDAZOLE  IVPB 500 milliGRAM(s) IV Intermittent every 8 hours  ondansetron Injectable 4 milliGRAM(s) IV Push once PRN      ANTIBIOTICS:  cefepime   IVPB 2000 milliGRAM(s) IV Intermittent every 8 hours  linezolid  IVPB 600 milliGRAM(s) IV Intermittent every 12 hours  metroNIDAZOLE  IVPB 500 milliGRAM(s) IV Intermittent every 8 hours

## 2020-03-10 NOTE — PROGRESS NOTE ADULT - SUBJECTIVE AND OBJECTIVE BOX
FANNIE STEVENS 39y Male  MRN#: [id number]         SUBJECTIVE  Patient is a 39y old Male who presents with a chief complaint of perineal pain and fever for 3 days (09 Mar 2020 16:27)  Currently admitted to medicine with the primary diagnosis of Sepsis    Patient seen today. he reports feeling fine. no fevers or chills          OBJECTIVE  PAST MEDICAL & SURGICAL HISTORY  Kidney stone  Rectal abscess  High cholesterol  No significant past surgical history    ALLERGIES:  No Known Allergies    MEDICATIONS:  STANDING MEDICATIONS  lactated ringers. 1000 milliLiter(s) IV Continuous <Continuous>    PRN MEDICATIONS  HYDROmorphone  Injectable 0.5 milliGRAM(s) IV Push every 10 minutes PRN  HYDROmorphone  Injectable 1 milliGRAM(s) IV Push every 10 minutes PRN  ondansetron Injectable 4 milliGRAM(s) IV Push once PRN      VITAL SIGNS: Last 24 Hours  T(C): 36.4 (10 Mar 2020 07:56), Max: 37.3 (09 Mar 2020 12:12)  T(F): 97.6 (10 Mar 2020 06:18), Max: 99.1 (09 Mar 2020 12:12)  HR: 62 (10 Mar 2020 07:56) (62 - 91)  BP: 121/66 (10 Mar 2020 07:56) (121/56 - 122/71)  BP(mean): --  RR: 18 (10 Mar 2020 07:56) (18 - 18)  SpO2: --    LABS:                        13.8   13.19 )-----------( 270      ( 09 Mar 2020 07:30 )             40.5     03-09    140  |  103  |  13  ----------------------------<  97  4.1   |  24  |  1.2    Ca    9.0      09 Mar 2020 07:30  Mg     2.2     03-09                Culture - Blood (collected 08 Mar 2020 08:12)  Source: .Blood None  Preliminary Report (09 Mar 2020 18:02):    No growth to date.    Culture - Abscess with Gram Stain (collected 07 Mar 2020 22:38)  Source: .Abscess perineal abscess  Preliminary Report (09 Mar 2020 08:46):    No growth    Culture - Urine (collected 07 Mar 2020 18:15)  Source: .Urine Clean Catch (Midstream)  Final Report (09 Mar 2020 07:09):    No growth    Culture - Blood (collected 07 Mar 2020 16:27)  Source: .Blood Blood  Preliminary Report (09 Mar 2020 02:03):    No growth to date.    Culture - Blood (collected 07 Mar 2020 16:26)  Source: .Blood Blood  Preliminary Report (09 Mar 2020 02:03):    No growth to date.          RADIOLOGY:          PHYSICAL EXAM:  	GENERAL: NAD, speaks in full sentences, no signs of respiratory distress  	HEAD:  Atraumatic, Normocephalic  	EYES: EOMI, PERRLA,   	CHEST/LUNG: Clear to auscultation bilaterally; No wheeze; No crackles; No accessory muscles used  	HEART: Regular rate and rhythm; No murmurs;   	ABDOMEN: Soft, Nontender, Nondistended; Bowel sounds present; No guarding    : + firm, no fluctuant, indurated area on the L lateral perineum extending to base of scrotum s/p I&D, Packing in place, covered with fluff that soiled with serosanguineous. testis descended with ttp L>R, + cremasteric b/l. No crepitus palpated.   	EXTREMITIES:  2+ Peripheral Pulses, No cyanosis or edema    PSYCH: AAOx3        ASSESSMENT & PLAN  39 years old male pt with past medical hx of kidney stones and DLD on diet control came to ER because of perineal pain and fever for 4 days, found to have scrotal abscess s/p I&D    # Scrotal abscess     ct scan showed perineal abscess with extension to scrotum     seen by Urology s/p I&D on 3/7. packing placed. keep scrotum elevated. cultures taken --> fu abscess cultures --> negative so far     do US testicles --> scrotal edema, no collection     Burn consult --> OR today for necrotic tissue debridement     follow blood and urine culture --> negative     wbc trending down     started on IV clindamycin and cefepime. ID consulted --> switch to zyvox, cefepime, and flagyl     tylenol PRN for fever     morphine PRN for pain     hydration LR at 75/hr     fu HBA1C --> 5.7 and HIV --> negative      # dld     on diet control     will check lipid profile    # DVT     on enoxaparin    # diet regular

## 2020-03-10 NOTE — PROGRESS NOTE ADULT - ASSESSMENT
· Assessment		  IMPRESSION:  # Recurrent perineal abscess with cellulitis  -no scrotal abscess or harvey's gangrene  -no scrotal wall cellulitis  -no epididymoorchitis  -s/p debridement of abscess 3/7  -s/p redebridement by Dr Ballesteros 3/10. Purulent drainage and necrotic tissue  -BCx 3/7 NG  -abscess cultures 3/7 NG  -UCx 3/7 NG    RECOMMENDATIONS:  -Zyvox 600 mg iv q12h  -cefepime 2 gm iv q8h  -flagyl 500 mg iv q8h  -f/u with burn

## 2020-03-11 ENCOUNTER — TRANSCRIPTION ENCOUNTER (OUTPATIENT)
Age: 40
End: 2020-03-11

## 2020-03-11 VITALS — HEIGHT: 67 IN | WEIGHT: 175.05 LBS

## 2020-03-11 LAB
ALBUMIN SERPL ELPH-MCNC: 3.8 G/DL — SIGNIFICANT CHANGE UP (ref 3.5–5.2)
ALP SERPL-CCNC: 73 U/L — SIGNIFICANT CHANGE UP (ref 30–115)
ALT FLD-CCNC: 55 U/L — HIGH (ref 0–41)
ANION GAP SERPL CALC-SCNC: 11 MMOL/L — SIGNIFICANT CHANGE UP (ref 7–14)
AST SERPL-CCNC: 20 U/L — SIGNIFICANT CHANGE UP (ref 0–41)
BILIRUB SERPL-MCNC: 0.2 MG/DL — SIGNIFICANT CHANGE UP (ref 0.2–1.2)
BUN SERPL-MCNC: 18 MG/DL — SIGNIFICANT CHANGE UP (ref 10–20)
CALCIUM SERPL-MCNC: 8.5 MG/DL — SIGNIFICANT CHANGE UP (ref 8.5–10.1)
CHLORIDE SERPL-SCNC: 107 MMOL/L — SIGNIFICANT CHANGE UP (ref 98–110)
CO2 SERPL-SCNC: 23 MMOL/L — SIGNIFICANT CHANGE UP (ref 17–32)
CREAT SERPL-MCNC: 1.1 MG/DL — SIGNIFICANT CHANGE UP (ref 0.7–1.5)
GLUCOSE SERPL-MCNC: 122 MG/DL — HIGH (ref 70–99)
HCT VFR BLD CALC: 36.1 % — LOW (ref 42–52)
HGB BLD-MCNC: 12.2 G/DL — LOW (ref 14–18)
MCHC RBC-ENTMCNC: 31.4 PG — HIGH (ref 27–31)
MCHC RBC-ENTMCNC: 33.8 G/DL — SIGNIFICANT CHANGE UP (ref 32–37)
MCV RBC AUTO: 93 FL — SIGNIFICANT CHANGE UP (ref 80–94)
NRBC # BLD: 0 /100 WBCS — SIGNIFICANT CHANGE UP (ref 0–0)
PLATELET # BLD AUTO: 306 K/UL — SIGNIFICANT CHANGE UP (ref 130–400)
POTASSIUM SERPL-MCNC: 4.4 MMOL/L — SIGNIFICANT CHANGE UP (ref 3.5–5)
POTASSIUM SERPL-SCNC: 4.4 MMOL/L — SIGNIFICANT CHANGE UP (ref 3.5–5)
PROT SERPL-MCNC: 6.1 G/DL — SIGNIFICANT CHANGE UP (ref 6–8)
RBC # BLD: 3.88 M/UL — LOW (ref 4.7–6.1)
RBC # FLD: 11.7 % — SIGNIFICANT CHANGE UP (ref 11.5–14.5)
SODIUM SERPL-SCNC: 141 MMOL/L — SIGNIFICANT CHANGE UP (ref 135–146)
WBC # BLD: 10.73 K/UL — SIGNIFICANT CHANGE UP (ref 4.8–10.8)
WBC # FLD AUTO: 10.73 K/UL — SIGNIFICANT CHANGE UP (ref 4.8–10.8)

## 2020-03-11 PROCEDURE — 99231 SBSQ HOSP IP/OBS SF/LOW 25: CPT

## 2020-03-11 PROCEDURE — 99239 HOSP IP/OBS DSCHRG MGMT >30: CPT

## 2020-03-11 RX ORDER — KETOROLAC TROMETHAMINE 30 MG/ML
1 SYRINGE (ML) INJECTION
Qty: 30 | Refills: 0
Start: 2020-03-11 | End: 2020-03-20

## 2020-03-11 RX ORDER — COLLAGENASE CLOSTRIDIUM HIST. 250 UNIT/G
1 OINTMENT (GRAM) TOPICAL
Refills: 0 | Status: DISCONTINUED | OUTPATIENT
Start: 2020-03-11 | End: 2020-03-11

## 2020-03-11 RX ORDER — COLLAGENASE CLOSTRIDIUM HIST. 250 UNIT/G
1 OINTMENT (GRAM) TOPICAL
Qty: 30 | Refills: 0
Start: 2020-03-11 | End: 2020-04-09

## 2020-03-11 RX ORDER — SODIUM HYPOCHLORITE 0.125 %
1 SOLUTION, NON-ORAL MISCELLANEOUS
Qty: 30 | Refills: 0
Start: 2020-03-11 | End: 2020-04-09

## 2020-03-11 RX ORDER — SODIUM HYPOCHLORITE 0.125 %
1 SOLUTION, NON-ORAL MISCELLANEOUS
Refills: 0 | Status: DISCONTINUED | OUTPATIENT
Start: 2020-03-11 | End: 2020-03-11

## 2020-03-11 RX ORDER — ACETAMINOPHEN 500 MG
2 TABLET ORAL
Qty: 0 | Refills: 0 | DISCHARGE
Start: 2020-03-11

## 2020-03-11 RX ADMIN — Medication 30 MILLIGRAM(S): at 14:01

## 2020-03-11 RX ADMIN — CEFEPIME 100 MILLIGRAM(S): 1 INJECTION, POWDER, FOR SOLUTION INTRAMUSCULAR; INTRAVENOUS at 13:27

## 2020-03-11 RX ADMIN — CEFEPIME 100 MILLIGRAM(S): 1 INJECTION, POWDER, FOR SOLUTION INTRAMUSCULAR; INTRAVENOUS at 05:38

## 2020-03-11 RX ADMIN — LINEZOLID 300 MILLIGRAM(S): 600 INJECTION, SOLUTION INTRAVENOUS at 05:38

## 2020-03-11 RX ADMIN — ENOXAPARIN SODIUM 40 MILLIGRAM(S): 100 INJECTION SUBCUTANEOUS at 11:03

## 2020-03-11 RX ADMIN — Medication 100 MILLIGRAM(S): at 05:00

## 2020-03-11 RX ADMIN — Medication 30 MILLIGRAM(S): at 12:14

## 2020-03-11 RX ADMIN — Medication 100 MILLIGRAM(S): at 13:27

## 2020-03-11 NOTE — PROGRESS NOTE ADULT - ATTENDING COMMENTS
Agree with resident's note, HPI, PE, assessment and plan.  Pt was seen and examined independently.     Pt was seen after he came from OR. no complaints, pain is tolerable, no other complaints.     no new labs today.     A/P: 39 years old male pt with past medical hx of kidney stones and DLD came to ER because of perineal pain and fever for 4 days. Pt was found to have perineal abscess and had I&D done by uro on admission 3/7. Pt is in monogamous relationship.     # Perineal abscess, s/p debridement on 3/10, purulent drainage and necrotic tissue.  - WBC trending down, no more high grade fever  - wound care as per burn   - ID adjusted ABx to Zyvox, Cefepime, Flagyl  - Cx from I&D negative, BCx negative, HIV negative   - repeat labs for tomorrow    # Metabolic lactic  acidosis due to active ongoing infection - lactate trending down     # Hepatic steatosis - weight loss advised    # Obesity - diet modification and exercise advised    DVT ppx    #Progress Note Handoff  Pending clinical improvement  Tests: Cx from I&D  Test Results: Hb and WBC  Family Discussion: not needed, pt agreed with plan of care  Future Disposition: home when stable
perineum granulating--> no purulent drainage post debridement and abscess drainage    rec: soap and water qd, santyl ointment , dakins wet to dry dressing change qd
Pt seen and examined 3/9/20. clinically improving   Wound examined and though improved I agree can benefit from further debridement with plastic surgery. Skin is thickened from past debridements

## 2020-03-11 NOTE — PROGRESS NOTE ADULT - ASSESSMENT
· Assessment		  IMPRESSION:  # Recurrent perineal abscess with cellulitis  -no scrotal abscess or harvey's gangrene  -no scrotal wall cellulitis  -no epididymoorchitis  -s/p debridement of abscess 3/7  -s/p redebridement by Dr Ballesteros 3/10. Purulent drainage and necrotic tissue  -BCx 3/7 , 8 NG  -abscess cultures 3/7 NG  -UCx 3/7 NG    RECOMMENDATIONS:  -Zyvox 600 mg iv q12h  -cefepime 2 gm iv q8h  -flagyl 500 mg iv q8h  -f/u with burn  -if no further intervention then change iv to po Bactrim 2 DS q12h and po Augmentin 875 mg q12h for 10 more days

## 2020-03-11 NOTE — DISCHARGE NOTE NURSING/CASE MANAGEMENT/SOCIAL WORK - NSDCPEWEB_GEN_ALL_CORE
[facility] for Tobacco Control website --- [email or web address] --- [email or web address]
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
## 2020-03-11 NOTE — PROGRESS NOTE ADULT - ASSESSMENT
perineum granulating--> no purulent drainage post debridement and abscess drainage    rec: soap and water qd, santyl ointment , dakins wet to dry dressing change qd

## 2020-03-11 NOTE — DISCHARGE NOTE PROVIDER - NSDCMRMEDTOKEN_GEN_ALL_CORE_FT
acetaminophen 325 mg oral tablet: 2 tab(s) orally every 6 hours, As needed, Temp greater or equal to 38.5C (101.3F)  amoxicillin-clavulanate 875 mg-125 mg oral tablet: 1 tab(s) orally 2 times a day for 10 days  Bactrim  mg-160 mg oral tablet: 2 tab(s) orally 2 times a day for 10 days  Dakins Full Strength 0.5% topical solution: Apply topically to affected area once a day   Flomax 0.4 mg oral capsule: 1 cap(s) orally once a day   Flomax 0.4 mg oral capsule: 1 cap(s) orally once a day (at bedtime)   ketorolac 10 mg oral tablet: 1 tab(s) orally every 8 hours, As Needed   Santyl 250 units/g topical ointment: Apply topically to affected area once a day

## 2020-03-11 NOTE — DISCHARGE NOTE NURSING/CASE MANAGEMENT/SOCIAL WORK - NSDCPEEMAIL_GEN_ALL_CORE
[facility] for Tobacco Control email [email or web address]
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
## 2020-03-11 NOTE — DISCHARGE NOTE PROVIDER - NSDCFUSCHEDAPPT_GEN_ALL_CORE_FT
FANNIE STEVENS ; 04/21/2020 ; [facility] [address] [place] [address] FANNIE STEVENS ; 04/21/2020 ; [facility] [address] [place] [address] FANNIE STEVENS ; 04/21/2020 ; [facility] [address] [place] [address]

## 2020-03-11 NOTE — DISCHARGE NOTE PROVIDER - CARE PROVIDER_API CALL
Kelley Hammer)  Internal Medicine  [address] [place] [address]  Phone: [phone]  Fax: [phone]  Follow Up Time: 1 week    Otf Ballesteros)  Plastic Surgery  [address] [place] [address]  Phone: [phone]  Fax: [phone]  Follow Up Time: 1 week    Manish Alaniz)  Surgical Physicians  [address] [place] [address], [address]  [place] [address]  Phone: [phone]  Fax: [phone]  Follow Up Time: 1 week
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
## 2020-03-11 NOTE — PROGRESS NOTE ADULT - REASON FOR ADMISSION
perineal pain and fever for 3 days

## 2020-03-11 NOTE — DISCHARGE NOTE NURSING/CASE MANAGEMENT/SOCIAL WORK - PATIENT PORTAL LINK FT
You can access the FollowMyHealth Patient Portal offered by [facility] by registering at the following website: [email or web address]. By joining [employer]’s FollowMyHealth portal, you will also be able to view your health information using other applications (apps) compatible with our system.

## 2020-03-11 NOTE — DISCHARGE NOTE PROVIDER - PROVIDER TOKENS
PROVIDER:[TOKEN:[[id number]],FOLLOWUP:[1 week]],PROVIDER:[TOKEN:[[id number]],FOLLOWUP:[1 week]],PROVIDER:[TOKEN:[[id number]],FOLLOWUP:[1 week]]

## 2020-03-11 NOTE — DISCHARGE NOTE PROVIDER - CARE PROVIDERS DIRECT ADDRESSES
[email or web address]

## 2020-03-11 NOTE — PROGRESS NOTE ADULT - SUBJECTIVE AND OBJECTIVE BOX
FANNIE STEVENS  39y, Male    All available historical data reviewed    OVERNIGHT EVENTS:  no fevers  has no complaints  ROS:  General: Denies rigors, night sweats  HEENT: Denies headache, rhinorrhea, sore throat, eye pain  CV: Denies CP, palpitations  PULM: Denies wheezing, hemoptysis  GI: Denies hematemesis, hematochezia, melena  : Denies discharge, hematuria  MSK: Denies arthralgias, myalgias  SKIN: Denies rash, lesions  NEURO: Denies paresthesias, weakness  PSYCH: Denies depression, anxiety    VITALS:  T(F): 97.4, Max: 98.2 (03-10-20 @ 21:31)  HR: 57  BP: 118/55  RR: 19Vital Signs Last 24 Hrs  T(C): 36.3 (11 Mar 2020 05:00), Max: 36.8 (10 Mar 2020 21:31)  T(F): 97.4 (11 Mar 2020 05:00), Max: 98.2 (10 Mar 2020 21:31)  HR: 57 (11 Mar 2020 05:00) (57 - 87)  BP: 118/55 (11 Mar 2020 05:00) (96/55 - 133/67)  BP(mean): --  RR: 19 (11 Mar 2020 05:00) (10 - 19)  SpO2: 100% (11 Mar 2020 05:00) (95% - 100%)    TESTS & MEASUREMENTS:                        12.2   10.73 )-----------( 306      ( 11 Mar 2020 07:26 )             36.1               Culture - Blood (collected 03-08-20 @ 08:12)  Source: .Blood None  Preliminary Report (03-09-20 @ 18:02):    No growth to date.    Culture - Abscess with Gram Stain (collected 03-07-20 @ 22:38)  Source: .Abscess perineal abscess  Preliminary Report (03-09-20 @ 08:46):    No growth    Culture - Urine (collected 03-07-20 @ 18:15)  Source: .Urine Clean Catch (Midstream)  Final Report (03-09-20 @ 07:09):    No growth    Culture - Blood (collected 03-07-20 @ 16:27)  Source: .Blood Blood  Preliminary Report (03-09-20 @ 02:03):    No growth to date.    Culture - Blood (collected 03-07-20 @ 16:26)  Source: .Blood Blood  Preliminary Report (03-09-20 @ 02:03):    No growth to date.            RADIOLOGY & ADDITIONAL TESTS:  Personal review of radiological diagnostics performed  Echo and EKG results noted when applicable.     MEDICATIONS:  acetaminophen   Tablet .. 650 milliGRAM(s) Oral every 6 hours PRN  cefepime   IVPB 2000 milliGRAM(s) IV Intermittent every 8 hours  enoxaparin Injectable 40 milliGRAM(s) SubCutaneous daily  ketorolac   Injectable 30 milliGRAM(s) IV Push every 6 hours  lactated ringers. 1000 milliLiter(s) IV Continuous <Continuous>  linezolid  IVPB 600 milliGRAM(s) IV Intermittent every 12 hours  metroNIDAZOLE  IVPB 500 milliGRAM(s) IV Intermittent every 8 hours      ANTIBIOTICS:  cefepime   IVPB 2000 milliGRAM(s) IV Intermittent every 8 hours  linezolid  IVPB 600 milliGRAM(s) IV Intermittent every 12 hours  metroNIDAZOLE  IVPB 500 milliGRAM(s) IV Intermittent every 8 hours

## 2020-03-12 LAB
-  AMPICILLIN/SULBACTAM: SIGNIFICANT CHANGE UP
-  CEFAZOLIN: SIGNIFICANT CHANGE UP
-  CLINDAMYCIN: SIGNIFICANT CHANGE UP
-  ERYTHROMYCIN: SIGNIFICANT CHANGE UP
-  GENTAMICIN: SIGNIFICANT CHANGE UP
-  OXACILLIN: SIGNIFICANT CHANGE UP
-  PENICILLIN: SIGNIFICANT CHANGE UP
-  RIFAMPIN: SIGNIFICANT CHANGE UP
-  TETRACYCLINE: SIGNIFICANT CHANGE UP
-  TRIMETHOPRIM/SULFAMETHOXAZOLE: SIGNIFICANT CHANGE UP
-  VANCOMYCIN: SIGNIFICANT CHANGE UP
METHOD TYPE: SIGNIFICANT CHANGE UP
SURGICAL PATHOLOGY STUDY: SIGNIFICANT CHANGE UP

## 2020-03-13 LAB
CULTURE RESULTS: SIGNIFICANT CHANGE UP
SPECIMEN SOURCE: SIGNIFICANT CHANGE UP

## 2020-03-15 LAB
CULTURE RESULTS: SIGNIFICANT CHANGE UP
ORGANISM # SPEC MICROSCOPIC CNT: SIGNIFICANT CHANGE UP
ORGANISM # SPEC MICROSCOPIC CNT: SIGNIFICANT CHANGE UP
SPECIMEN SOURCE: SIGNIFICANT CHANGE UP

## 2020-03-18 NOTE — ED ADULT NURSE NOTE - FINAL NURSING ELECTRONIC SIGNATURE
-- DO NOT REPLY / DO NOT REPLY ALL --  -- Message is from the [facility]--    Transfer to RN      Chief Complaint:  cigarette smoker uses inhaler, nebulizer & cpap- History of shortness of breath regularly.     Left home Friday for dispensary  & monday 9th for endoscopy     3day consistent headache, chills, hot, flashes, cough fever 100.5    does not usually get fevers    Caller Information       Type Contact Phone    03/18/2020 10:32 AM Phone (Incoming) Ramya Herzog (Self) [phone] (H)          Provider Name:  TRACEE BRADLEY Practice Site Name:  [facility] [address]     Alternative Phone Number:  n/a    
21-Dec-2018 16:06

## 2020-03-19 PROBLEM — K61.1 RECTAL ABSCESS: Chronic | Status: ACTIVE | Noted: 2020-03-07

## 2020-03-19 PROBLEM — N20.0 CALCULUS OF KIDNEY: Chronic | Status: ACTIVE | Noted: 2020-03-07

## 2020-03-23 DIAGNOSIS — A41.9 SEPSIS, UNSPECIFIED ORGANISM: ICD-10-CM

## 2020-03-23 DIAGNOSIS — Z87.442 PERSONAL HISTORY OF URINARY CALCULI: ICD-10-CM

## 2020-03-23 DIAGNOSIS — R50.9 FEVER, UNSPECIFIED: ICD-10-CM

## 2020-03-23 DIAGNOSIS — E87.2 ACIDOSIS: ICD-10-CM

## 2020-03-23 DIAGNOSIS — L03.315 CELLULITIS OF PERINEUM: ICD-10-CM

## 2020-03-23 DIAGNOSIS — E66.9 OBESITY, UNSPECIFIED: ICD-10-CM

## 2020-03-23 DIAGNOSIS — L02.215 CUTANEOUS ABSCESS OF PERINEUM: ICD-10-CM

## 2020-03-23 DIAGNOSIS — E78.5 HYPERLIPIDEMIA, UNSPECIFIED: ICD-10-CM

## 2020-03-23 DIAGNOSIS — K76.0 FATTY (CHANGE OF) LIVER, NOT ELSEWHERE CLASSIFIED: ICD-10-CM

## 2020-03-24 ENCOUNTER — LABORATORY RESULT (OUTPATIENT)
Age: 40
End: 2020-03-24

## 2020-03-24 ENCOUNTER — APPOINTMENT (OUTPATIENT)
Dept: BURN CARE | Facility: CLINIC | Age: 40
End: 2020-03-24
Payer: COMMERCIAL

## 2020-03-24 ENCOUNTER — OUTPATIENT (OUTPATIENT)
Dept: OUTPATIENT SERVICES | Facility: HOSPITAL | Age: 40
LOS: 1 days | Discharge: HOME | End: 2020-03-24

## 2020-03-24 DIAGNOSIS — Z87.442 PERSONAL HISTORY OF URINARY CALCULI: ICD-10-CM

## 2020-03-24 PROCEDURE — 16025 DRESS/DEBRID P-THICK BURN M: CPT

## 2020-03-24 PROCEDURE — 99213 OFFICE O/P EST LOW 20 MIN: CPT | Mod: 25

## 2020-03-24 RX ORDER — OXYCODONE AND ACETAMINOPHEN 5; 325 MG/1; MG/1
5-325 TABLET ORAL
Qty: 30 | Refills: 0 | Status: ACTIVE | COMMUNITY
Start: 2020-03-24 | End: 1900-01-01

## 2020-03-24 NOTE — ASSESSMENT
[FreeTextEntry1] : perineum post debridement and abscess drainage-> healing --> culture sent --> local wound care  [Wound Care] : wound care
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
## 2020-03-24 NOTE — PHYSICAL EXAM
[Healing] : healing [Size%: ______] : Size: [unfilled]% [Infected?] : Infected: No [3] : 3 out of 10 [Abnormal] : abnormal [Medium] : medium [] : no [de-identified] : perineum post debridement and abscess drainage-> healing --> culture sent --> local wound care  [TWNoteComboBox1] : CHIKIS
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
## 2020-03-31 RX ORDER — AMOXICILLIN AND CLAVULANATE POTASSIUM 875; 125 MG/1; MG/1
875-125 TABLET, COATED ORAL
Qty: 14 | Refills: 0 | Status: ACTIVE | COMMUNITY
Start: 2020-03-31 | End: 1900-01-01

## 2020-04-09 ENCOUNTER — OUTPATIENT (OUTPATIENT)
Dept: OUTPATIENT SERVICES | Facility: HOSPITAL | Age: 40
LOS: 1 days | Discharge: HOME | End: 2020-04-09

## 2020-04-09 ENCOUNTER — APPOINTMENT (OUTPATIENT)
Dept: BURN CARE | Facility: CLINIC | Age: 40
End: 2020-04-09
Payer: COMMERCIAL

## 2020-04-09 DIAGNOSIS — L02.91 CUTANEOUS ABSCESS, UNSPECIFIED: ICD-10-CM

## 2020-04-09 PROCEDURE — 16025 DRESS/DEBRID P-THICK BURN M: CPT

## 2020-04-09 PROCEDURE — 99213 OFFICE O/P EST LOW 20 MIN: CPT | Mod: 25

## 2020-04-21 ENCOUNTER — APPOINTMENT (OUTPATIENT)
Dept: UROLOGY | Facility: CLINIC | Age: 40
End: 2020-04-21

## 2020-04-23 ENCOUNTER — OUTPATIENT (OUTPATIENT)
Dept: OUTPATIENT SERVICES | Facility: HOSPITAL | Age: 40
LOS: 1 days | Discharge: HOME | End: 2020-04-23

## 2020-04-23 ENCOUNTER — APPOINTMENT (OUTPATIENT)
Dept: BURN CARE | Facility: CLINIC | Age: 40
End: 2020-04-23
Payer: COMMERCIAL

## 2020-04-23 PROCEDURE — 99213 OFFICE O/P EST LOW 20 MIN: CPT | Mod: 25

## 2020-04-23 PROCEDURE — 16020 DRESS/DEBRID P-THICK BURN S: CPT

## 2020-04-29 ENCOUNTER — APPOINTMENT (OUTPATIENT)
Dept: UROLOGY | Facility: CLINIC | Age: 40
End: 2020-04-29

## 2020-08-27 NOTE — ED PROVIDER NOTE - DISPOSITION TYPE
Walking the halls alone  Polite and cooperative during interaction  Anxious regarding discharge, concerned about being alone at home  States here she has supports and companionship but at home she lives with her son who is usually not there  Encouraged support groups and informed to reach out to support numbers provided on discharge papers in times of crisis  Compliant with medications, denies side effects  No questions or concerns  DISCHARGE

## 2020-11-10 NOTE — PATIENT PROFILE ADULT - BILL PAYMENT
Subjective:       Patient ID: Sami Rick Jr. is a 75 y.o. male.    Chief Complaint: Depression    HPI     Here for a f/u    Asthma stable while on advair. Rarely uses albuterol inhaler.      hld stable while on zetia.      Depression stable while on lexapro.      Takes celebrex for arthritis. Has inflammatory oa arthritic nodules of hands in the pip and dip joints.    bph stable while on flomax.     C/o nocturna leg cramps.     Has allergic blepharitis x 1 week. Requesting maxitrol eye drops.       Review of Systems      Review of Systems   Constitutional: Negative for fever and chills.   HENT: Negative for hearing loss and neck stiffness.    Eyes: Negative for redness and itching.   Respiratory: Negative for choking.    Cardiovascular: Negative for chest pain and leg swelling.  Abdomen: Negative for abdominal pain and blood in stool.   Genitourinary: Negative for dysuria and flank pain.   Musculoskeletal: Negative for back pain and gait problem.   Neurological: Negative for light-headedness and headaches.   Hematological: Negative for adenopathy.         Objective:      Physical Exam  HENT:      Head: Atraumatic.   Eyes:      Conjunctiva/sclera: Conjunctivae normal.      Pupils: Pupils are equal, round, and reactive to light.      Comments: Mild swelling of bilat lower eyelids   Neck:      Musculoskeletal: Normal range of motion.   Cardiovascular:      Rate and Rhythm: Normal rate and regular rhythm.      Heart sounds: No murmur.   Pulmonary:      Effort: Pulmonary effort is normal.      Breath sounds: Normal breath sounds. No wheezing.   Lymphadenopathy:      Cervical: No cervical adenopathy.         Assessment:       1. Mild intermittent asthma without complication    2. Benign prostatic hyperplasia, unspecified whether lower urinary tract symptoms present    3. Hyperlipidemia, unspecified hyperlipidemia type    4. Depression, unspecified depression type    5. Prostate cancer screening    6. Nocturnal leg  cramps    7. Hand arthritis        Plan:       Mild intermittent asthma without complication    Benign prostatic hyperplasia, unspecified whether lower urinary tract symptoms present    Hyperlipidemia, unspecified hyperlipidemia type    Depression, unspecified depression type    Prostate cancer screening  -     PSA, Screening; Future    Nocturnal leg cramps    Hand arthritis    Other orders  -     neomycin-polymyxin-dexamethasone (MAXITROL) 3.5mg/mL-10,000 unit/mL-0.1 % DrpS; Place 1 drop into both eyes 3 (three) times daily.  Dispense: 5 mL; Refill: 0  -     Influenza - Quadrivalent (Adjuvanted)                Plan:  Start maxitrol for blepharitis  otc mag ox 400 at night for leg cramps  Order cmp, lipids and psa  Cont all other meds    Medication List with Changes/Refills   New Medications    NEOMYCIN-POLYMYXIN-DEXAMETHASONE (MAXITROL) 3.5MG/ML-10,000 UNIT/ML-0.1 % DRPS    Place 1 drop into both eyes 3 (three) times daily.   Current Medications    ADVAIR DISKUS 250-50 MCG/DOSE DISKUS INHALER    INHALE 1 PUFF INTO THE LUNGS TWICE DAILY    CELECOXIB (CELEBREX) 200 MG CAPSULE    TAKE 2 CAPSULES(400 MG) BY MOUTH EVERY DAY    ESCITALOPRAM OXALATE (LEXAPRO) 10 MG TABLET    TAKE 1 TABLET(10 MG) BY MOUTH EVERY DAY    EZETIMIBE (ZETIA) 10 MG TABLET    TAKE 1 TABLET(10 MG) BY MOUTH EVERY DAY    MONTELUKAST (SINGULAIR) 10 MG TABLET    TAKE 1 TABLET(10 MG) BY MOUTH EVERY EVENING    PROVENTIL HFA 90 MCG/ACTUATION INHALER    INHALE TWO PUFFS BY MOUTH EVERY 4 HOURS AS NEEDED FOR WHEEZING    TAMSULOSIN (FLOMAX) 0.4 MG CAP    TAKE 2 CAPSULES(0.8 MG) BY MOUTH EVERY DAY        no

## 2020-11-12 NOTE — ED ADULT NURSE NOTE - NSSEPSISCRITERIAMET_ED_A_ED
Last appt with Radha: 4/17/2020  Next appt with Dr Durbin: ZAFAR Hughes- Please call to get him on the schedule.     Abnormal VS & WBC

## 2021-02-26 NOTE — PRE-ANESTHESIA EVALUATION ADULT - WEIGHT IN LBS
175
CAD, Aortic Stenosis
81y/o F h/o hypothyroid and DIVYA. Brought to [facility] for dyspnea and diarrhea. Found to have melena. She received 2 units of PRBC for a hemoglobin of 6.9. She was found to have a troponin of 656 and 710 and a pro-BNP of 29,809.Cardiac cath 2/16 revealed Multivessel CAD and Severe AS. Pt as also diagnosed with sepsis secondary to a UTI. underwent Endoscopy 2/17 as part of her GI anemia workup, revealed a hiatal hernia. Colonoscopy 2/19 w/o bleeding/ acute pathology. CABG/AVR scheduled 2/24

## 2022-10-13 ENCOUNTER — APPOINTMENT (OUTPATIENT)
Dept: ORTHOPEDIC SURGERY | Facility: CLINIC | Age: 42
End: 2022-10-13

## 2022-10-13 VITALS — HEIGHT: 67 IN | BODY MASS INDEX: 32.33 KG/M2 | WEIGHT: 206 LBS

## 2022-10-13 PROCEDURE — 73562 X-RAY EXAM OF KNEE 3: CPT | Mod: RT

## 2022-10-13 PROCEDURE — 99203 OFFICE O/P NEW LOW 30 MIN: CPT

## 2022-10-13 NOTE — ASSESSMENT
[FreeTextEntry1] :  Given pain, swelling and limited motion I am recommending an MRI of his right knee to rule out internal derangement.  He is using a knee sleeve, we discussed anti-inflammatory.   Cane was provided since he was having difficulty ambulating.Follow-up after MRI is completed\par \par This patient was seen under the supervision of Dr. Campuzano.\par

## 2022-10-13 NOTE — IMAGING
[de-identified] :   Examination of the right knee, swelling small knee effusion is noted, no ecchymosis, no erythema.  Skin is intact patient is able to straight leg raise.  Significant restriction through knee flexion and extension with pain and guarding.  Tenderness is noted along the medial joint line medial collateral ligament, no tenderness along lateral line.  No tenderness over the patella, patella tendon or quadriceps tendon.  Calf is soft.  Positive Anni's. \par \par X-ray right knee No fracture or dislocation.  Spurring off the anterior patella

## 2022-10-13 NOTE — HISTORY OF PRESENT ILLNESS
[de-identified] :  42-year-old male comes in today for evaluation of his right knee pain that began yesterday.  Patient states he was playing basketball thereafter he began to feel pain and swelling in the right knee.  He is unsure if he twisted the knee.  He has a knee sleeve.

## 2022-11-23 NOTE — ED ADULT TRIAGE NOTE - ESI TRIAGE ACUITY LEVEL, MLM
Physician Discharge Summary    Patient ID:  Dyan Rocha  [id number]  73 year old  1948    Admit date: 11/7/2022    Discharge date and time:  11/23/2022       Admitting Physician: Diana Gross MD     Discharge Physician: Diana Gross MD    Discharge Diagnoses:     MRSA bacteremia.    Right lower extremity cellulitis from MRSA infection pain   Acute metabolic encephalopathy.    TIA   Vertebral artery stenosis.  NSTEMI type 2 from demand ischemia.  History of chronic atrial fibrillation started on anticoagulation    Concurrent Diagnoses:  Patient Active Problem List   Diagnosis   • Essential hypertension, malignant   • Neutrophilic leukocytosis   • Fever   • Coronary artery disease involving native coronary artery of native heart without angina pectoris   • Chronic atrial fibrillation (CMS/HCC)   • Cardiomyopathy, ischemic   • Morbid obesity with BMI of 60.0-69.9, adult (CMS/HCC)   • Dyslipidemia   • Elevated troponin I level   • Encounter for therapeutic drug monitoring   • Long term (current) use of anticoagulants   • Tachycardia-bradycardia syndrome (CMS/HCC)   • Hemoperitoneum   • Ruptured ovarian cyst   • Chronic diastolic (congestive) heart failure (CMS/HCC)   • Non compliance with medical treatment   • Acute blood loss anemia   • Acute UTI   • Essential (primary) hypertension   • Acute metabolic encephalopathy   • Acute hypoxemic respiratory failure (CMS/HCC)   • Bilateral pneumonia   • Dementia (CMS/HCC)   • Possible pneumonia of left lower lobe due to infectious organism   • Dysphagia, oral phase   • Dysphagia, oropharyngeal phase   • Diarrhea of presumed infectious origin   • Probable cellulitis of left lower extremity       Admission Condition: stable    Discharged Condition: good    Hospital Course:     Confusion due to acute metabolic encephalopathy secondary to sepsis from cellulitis.     resolved  Currently alert and oriented x3.            MRSA bacteremia from right lower  extremity cellulitis-POA.          Blood cultures positive for MRSA 2 /2 on 11/07/2022.    Wound drainage cx- MRSA  Repeat blood culture on 11/09/2022-ve.  Infectious disease was consulted and recommended right lower extremity CT which was negative for abscess.  Also recommended echocardiogram which was negative followed by [facility].  Transesophageal echocardiogram done on 11/16/2022.  No sign of endocarditis.    Patient completed 2 weeks of IV vancomycin on 11/22.        Transient ischemic attack.     Patient was noted with acute confusion on 11/17.  Stroke workup was negative except for CTA head and neck which revealed vertebral artery stenosis.  MRI brain was negative for acute infarct.  Pt started on Xarelto, c/w home dose of statin and ASA.       Vertebral artery stenosis.       Started on  Xarelto.   continue with home dose of Plavix and statin.  Outpatient follow-up with vascular surgery.             Right lower extremity cellulitis in the setting of status dermatitis.       Resolved.  Lower extremity ulcers healed.  Treated with daily mupirocin ointment dressing.            NSTEMI type 2     Troponins elevated and trended down.    No acute chest pain.    Patient has chronically elevated troponin elevation.    Repeat echo with EF of 40% down from previous.    Continue lisinopril, metoprolol, aspirin Lipitor.           Pending issues to be followed up by PCP   none     New Medications Started During Hospitalization:  Xarelto.    Medications Discontinued During Hospitalization: ASA      Discharge Medications:     Summary of your Discharge Medications      Take these Medications      Details   acetaminophen 325 MG tablet  Commonly known as: TYLENOL   Take 2 tablets by mouth every 4 hours as needed for Pain or Fever.     ASPERCREME EX   Apply 1 application topically daily as needed (to knees for pain).     atorvastatin 80 MG tablet  Commonly known as: LIPITOR   Take 1 tablet by mouth nightly.     clopidogrel 75 MG  3 tablet  Commonly known as: PLAVIX   Take 1 tablet by mouth daily.     digoxin 0.125 MG tablet  Commonly known as: LANOXIN   Take 1 tablet by mouth daily.     furosemide 20 MG tablet  Commonly known as: LASIX   Take 1 tablet by mouth daily.     lisinopril 2.5 MG tablet  Commonly known as: ZESTRIL   Take 1 tablet by mouth daily.     metoPROLOL succinate 25 MG 24 hr tablet  Commonly known as: TOPROL-XL   Take 1 tablet by mouth daily.     rivaroxaban 20 MG Tab  Commonly known as: XARELTO   Take 1 tablet by mouth daily (with dinner).     sertraline 25 MG tablet  Commonly known as: ZOLOFT   Take 1 tablet by mouth daily.            Consults: ID    Procedures performed XR Chest AP or PA    Result Date: 11/7/2022  INTERPRETATION LOCATION: [facility] PROCEDURE:  XR CHEST AP OR PA VIEW(S):   AP DATE:  11/7/2022 2:12 PM COMPARISONS: 12/30/2021 CLINICAL INDICATION: 73 years Female  ams  FINDINGS: There is enlargement of the cardiac silhouette with thoracic aortic wall calcification, similar to the previous study. No pleural effusion or pneumothorax. Central bronchovascular prominence is seen. Interstitial appearing pulmonary opacity is noted bilaterally, nonspecific in nature. Findings could relate to interstitial pulmonary edema. No convincing evidence of superimposed more focal pulmonary consolidation or infiltrate. Signed by: Yariel Monsivais    CT Chest PE Imaging    Result Date: 11/7/2022  INTERPRETATION LOCATION: [facility] PROCEDURE:  CT ANGIOGRAM CHEST PE IMAGING- 3D DATE:  11/7/2022 5:23 PM COMPARISONS: Radiograph today. CLINICAL INDICATION: 73 years Female altered mental status, shortness of breath  PE suspected, low/intermediate prob, positive D-dimer  CONTRAST:100 ccOmnipaque 350 IV TECHNIQUE:  Images were obtained in the axial plane following the intravenous administration of contrast. Coronal and sagittal reformations were created. Multiplanar MIP reconstructions were created by the CT  technologist to aid in evaluation of the vasculature. Individualized dose optimization technique was used for the procedure performed. FINDINGS: Severely limited exam due to extensive patient motion. PULMONARY ARTERIES: No filling defects are identified in the pulmonary arteries to suggest large pulmonary emboli. HEART AND GREAT VESSELS: The heart is mildly enlarged. There is a physiologic amount of pericardial fluid.  Extensive coronary artery calcification present. The thoracic aorta is normal in caliber and contour.  LUNGS: Hazy groundglass densities are noted in the dependent portion of both lungs most prominent in the lower lobes bilaterally. MEDIASTINUM AND CHELSIE: 1.4 cm nonspecific precarinal lymph node. Right paratracheal lymph node measures 1.4 cm. Tiny hiatal hernia. PLEURAL SPACES: Small bilateral pleural effusions. CHEST WALL AND AXILLA: No acute process. UPPER ABDOMEN: Evaluation of the upper abdomen is limited by arterial phase enhancement. No definite acute abnormalities are demonstrated.     IMPRESSION:  1. Limited exam with extensive patient motion. No CT angiographic evidence of definite large pulmonary embolism.. 2.  Small bilateral pleural effusions with scattered groundglass densities noted in the dependent portions of both lungs most prominent in the lower lobes. The pattern is most suggestive of scattered possibly atypical infiltrates rather than atelectasis. Correlate clinically. 3. Mild cardiomegaly with coronary artery calcification present. 4. Mildly enlarged mediastinal lymph nodes may be reactive but are overall nonspecific. Signed by: Markel Ward    US Soft Tissue Lower Extremity Right    Result Date: 11/7/2022  INTERPRETATION LOCATION: [facility] PROCEDURE:  US SOFT TISSUE LOWER EXTREMITY RIGHT DATE: 11/7/2022 4:42 PM COMPARISONS: None. CLINICAL INDICATION: 73 years Female leg swelling  r/o abscess  TECHNIQUE: Ultrasound FINDINGS: The right lower extremity low the knee was  evaluated. Nonspecific extensive subcutaneous edema is present which can be seen with cellulitis among other etiologies. However there is no focal drainable fluid collection to certainly suggest abscess. Signed by: Markel Ward    CT HEAD WO CONTRAST    Result Date: 11/7/2022  INTERPRETATION LOCATION: [facility] PROCEDURE:  CT HEAD WO CONTRAST DATE:  11/7/2022 1:11 PM COMPARISONS: 12/15/2021 and earlier CLINICAL INDICATION: 73 years Female  Mental status change, unknown cause  TECHNIQUE:  Helical images were obtained from the skull base through the calvarium.  Coronal and sagittal reformations were created.  Individualized dose optimization technique was used for the procedure performed. FINDINGS: VENTRICULAR SYSTEM AND BRAIN PARENCHYMA: The ventricles and sulci are within normal limits for the patient's age. No intraparenchymal hemorrhage is demonstrated. Grey-white differentiation appears normal. There is no evidence of mass or mass effect.   Small cystic area left cristy felt to be incidental and chronic. Could be old lacunar infarct. No acute process seen. Areas of moderately prominent deep white matter low-attenuation also felt to be chronic. EXTRA-AXIAL SPACES: No extra-axial hemorrhage or other abnormal extra-axial fluid collection is demonstrated. CALVARIUM AND SINUSES: No calvarial fractures are demonstrated. The visualized paranasal sinuses and mastoid air cells appear grossly clear.  Subcutaneous nodule right scalp chronic and stable.     IMPRESSION: No evidence of intracranial mass, hemorrhage, or edema. Stable findings, no new or acute appearing abnormality Signed by: Tristan Castelan     Discharge Labs:  Recent Labs   Lab 11/23/22  0643 11/23/22  0604 11/22/22  0550 11/21/22  0546   SODIUM 137  --  140 140   POTASSIUM 4.1  --  4.0 4.1   CHLORIDE 106  --  107 108   CO2 26  --  25 26   BUN 19  --  19 17   CREATININE 0.67  --  0.73 0.71   GLUCOSE 98  --  102* 98   WBC  --  8.2 7.2 8.7    HGB  --  15.4 15.4 14.9   HCT  --  48.4* 47.2* 46.1   PLT  --  153 175 158           Discharge Exam:  Blood pressure 123/69, pulse (!) 55, temperature 97.6 °F (36.4 °C), temperature source Oral, resp. rate 17, height 5' 4\" (1.626 m), weight 108 kg (238 lb), SpO2 95 %.    GEN: NAD. AOx3  CHEST: Lungs CTA and percussion anteriorly and posteriorly. No rales, wheezing or crackles.  CV: RRR. S1, S2 normal. No S3, S4. No murmurs, rubs, gallops.   ABDOMEN: Soft, non-tender. No rebound or guarding. No masses or hepatosplenomegaly. No hernias.  EXT: No cyanosis or edema. No joint effusion.      Disposition: Home    Patient Instructions:     Activity:activity as tolerated  Diet: regular  Wound Care: None needed     Code status: Full Resuscitation    No follow-up provider specified.        .    Time spent on discharge was greater than 30 minutes    Signed:  Diana Gross MD  11/23/2022  10:12 AM

## 2022-12-01 ENCOUNTER — APPOINTMENT (OUTPATIENT)
Dept: ORTHOPEDIC SURGERY | Facility: CLINIC | Age: 42
End: 2022-12-01

## 2022-12-01 PROCEDURE — 99204 OFFICE O/P NEW MOD 45 MIN: CPT

## 2022-12-05 NOTE — HISTORY OF PRESENT ILLNESS
[de-identified] : Patient here for evaluation right knee pain.\par Complains of joint line pain\par Positive mechanical symptoms and catching\par \par NAD\par Right knee\par No skin breakdown\par Medial joint line ttp\par Positive kamlesh\par Negative lachman\par Negative varus/valgus instability\par ROM 0-130\par Pain with forced extension and flexion\par NVI\par Compartments soft and NT\par \par Xray reviewed and significant for right knee mild degenerative changes\par \par mri right knee : mmt\par \par Plan\par went over findings\par explained the mri and tx options\par due to inc pain, will proceed with surgery\par op vs nonop and r/b/a \par will proceed with right knee arthroscopy, medial meniscectomy\par \par Operative and nonoperative options discussed with patient. Surgical risks, benefits, and alternatives explained. Surgical risks include but are not exclusive to bleeding, infection, neurovascular damage, continued pain, stiffness, scarring, rsd, dvt/pe, potential failure of surgery that may require further surgery in the future. I went over incisions and rehabilitation. All questions answered. \par \par

## 2023-01-03 ENCOUNTER — RESULT REVIEW (OUTPATIENT)
Age: 43
End: 2023-01-03

## 2023-01-03 ENCOUNTER — OUTPATIENT (OUTPATIENT)
Dept: OUTPATIENT SERVICES | Facility: HOSPITAL | Age: 43
LOS: 1 days | Discharge: HOME | End: 2023-01-03
Payer: COMMERCIAL

## 2023-01-03 VITALS
TEMPERATURE: 98 F | HEIGHT: 67 IN | DIASTOLIC BLOOD PRESSURE: 80 MMHG | OXYGEN SATURATION: 100 % | RESPIRATION RATE: 18 BRPM | WEIGHT: 203.71 LBS | SYSTOLIC BLOOD PRESSURE: 161 MMHG | HEART RATE: 66 BPM

## 2023-01-03 DIAGNOSIS — Z87.2 PERSONAL HISTORY OF DISEASES OF THE SKIN AND SUBCUTANEOUS TISSUE: Chronic | ICD-10-CM

## 2023-01-03 DIAGNOSIS — S83.8X1D SPRAIN OF OTHER SPECIFIED PARTS OF RIGHT KNEE, SUBSEQUENT ENCOUNTER: ICD-10-CM

## 2023-01-03 DIAGNOSIS — Z01.818 ENCOUNTER FOR OTHER PREPROCEDURAL EXAMINATION: ICD-10-CM

## 2023-01-03 LAB
ALBUMIN SERPL ELPH-MCNC: 4.8 G/DL — SIGNIFICANT CHANGE UP (ref 3.5–5.2)
ALP SERPL-CCNC: 88 U/L — SIGNIFICANT CHANGE UP (ref 30–115)
ALT FLD-CCNC: 48 U/L — HIGH (ref 0–41)
ANION GAP SERPL CALC-SCNC: 16 MMOL/L — HIGH (ref 7–14)
APTT BLD: 30.7 SEC — SIGNIFICANT CHANGE UP (ref 27–39.2)
AST SERPL-CCNC: 22 U/L — SIGNIFICANT CHANGE UP (ref 0–41)
BASOPHILS # BLD AUTO: 0.05 K/UL — SIGNIFICANT CHANGE UP (ref 0–0.2)
BASOPHILS NFR BLD AUTO: 0.6 % — SIGNIFICANT CHANGE UP (ref 0–1)
BILIRUB SERPL-MCNC: 0.3 MG/DL — SIGNIFICANT CHANGE UP (ref 0.2–1.2)
BUN SERPL-MCNC: 14 MG/DL — SIGNIFICANT CHANGE UP (ref 10–20)
CALCIUM SERPL-MCNC: 9.3 MG/DL — SIGNIFICANT CHANGE UP (ref 8.4–10.5)
CHLORIDE SERPL-SCNC: 105 MMOL/L — SIGNIFICANT CHANGE UP (ref 98–110)
CO2 SERPL-SCNC: 23 MMOL/L — SIGNIFICANT CHANGE UP (ref 17–32)
CREAT SERPL-MCNC: 1 MG/DL — SIGNIFICANT CHANGE UP (ref 0.7–1.5)
EGFR: 98 ML/MIN/1.73M2 — SIGNIFICANT CHANGE UP
EOSINOPHIL # BLD AUTO: 0.22 K/UL — SIGNIFICANT CHANGE UP (ref 0–0.7)
EOSINOPHIL NFR BLD AUTO: 2.8 % — SIGNIFICANT CHANGE UP (ref 0–8)
GLUCOSE SERPL-MCNC: 89 MG/DL — SIGNIFICANT CHANGE UP (ref 70–99)
HCT VFR BLD CALC: 46.5 % — SIGNIFICANT CHANGE UP (ref 42–52)
HGB BLD-MCNC: 16 G/DL — SIGNIFICANT CHANGE UP (ref 14–18)
IMM GRANULOCYTES NFR BLD AUTO: 0.3 % — SIGNIFICANT CHANGE UP (ref 0.1–0.3)
INR BLD: 0.9 RATIO — SIGNIFICANT CHANGE UP (ref 0.65–1.3)
LYMPHOCYTES # BLD AUTO: 2.13 K/UL — SIGNIFICANT CHANGE UP (ref 1.2–3.4)
LYMPHOCYTES # BLD AUTO: 27.1 % — SIGNIFICANT CHANGE UP (ref 20.5–51.1)
MCHC RBC-ENTMCNC: 30.7 PG — SIGNIFICANT CHANGE UP (ref 27–31)
MCHC RBC-ENTMCNC: 34.4 G/DL — SIGNIFICANT CHANGE UP (ref 32–37)
MCV RBC AUTO: 89.3 FL — SIGNIFICANT CHANGE UP (ref 80–94)
MONOCYTES # BLD AUTO: 0.53 K/UL — SIGNIFICANT CHANGE UP (ref 0.1–0.6)
MONOCYTES NFR BLD AUTO: 6.8 % — SIGNIFICANT CHANGE UP (ref 1.7–9.3)
NEUTROPHILS # BLD AUTO: 4.9 K/UL — SIGNIFICANT CHANGE UP (ref 1.4–6.5)
NEUTROPHILS NFR BLD AUTO: 62.4 % — SIGNIFICANT CHANGE UP (ref 42.2–75.2)
NRBC # BLD: 0 /100 WBCS — SIGNIFICANT CHANGE UP (ref 0–0)
PLATELET # BLD AUTO: 259 K/UL — SIGNIFICANT CHANGE UP (ref 130–400)
POTASSIUM SERPL-MCNC: 4.3 MMOL/L — SIGNIFICANT CHANGE UP (ref 3.5–5)
POTASSIUM SERPL-SCNC: 4.3 MMOL/L — SIGNIFICANT CHANGE UP (ref 3.5–5)
PROT SERPL-MCNC: 7 G/DL — SIGNIFICANT CHANGE UP (ref 6–8)
PROTHROM AB SERPL-ACNC: 10.2 SEC — SIGNIFICANT CHANGE UP (ref 9.95–12.87)
RBC # BLD: 5.21 M/UL — SIGNIFICANT CHANGE UP (ref 4.7–6.1)
RBC # FLD: 12.2 % — SIGNIFICANT CHANGE UP (ref 11.5–14.5)
SODIUM SERPL-SCNC: 144 MMOL/L — SIGNIFICANT CHANGE UP (ref 135–146)
WBC # BLD: 7.85 K/UL — SIGNIFICANT CHANGE UP (ref 4.8–10.8)
WBC # FLD AUTO: 7.85 K/UL — SIGNIFICANT CHANGE UP (ref 4.8–10.8)

## 2023-01-03 PROCEDURE — 93010 ELECTROCARDIOGRAM REPORT: CPT

## 2023-01-03 PROCEDURE — 71046 X-RAY EXAM CHEST 2 VIEWS: CPT | Mod: 26

## 2023-01-03 NOTE — H&P PST ADULT - NSICDXPASTMEDICALHX_GEN_ALL_CORE_FT
PAST MEDICAL HISTORY:  High cholesterol     Kidney stone     Obese     Rectal abscess     Smoker     Torn meniscus

## 2023-01-03 NOTE — H&P PST ADULT - HISTORY OF PRESENT ILLNESS
Patient is a 42 year old male presenting to PAST in preparation for RIGHT KNEE ARTHROSCOPY MEDIAL MENISCECTOMY on 1/23 under gen anesthesia by Dr. Gonzalez   Reports he injured his knee playing basketball in Oct 2022, Reports he was given analgesics but continued to have discomfort. Has been advised to have above  Reports pain is 4/10 improves with rest and worsens with activities  PATIENT CURRENTLY DENIES CHEST PAIN  SHORTNESS OF BREATH  PALPITATIONS,  DYSURIA, OR UPPER RESPIRATORY INFECTION IN PAST 2 WEEKS  EXERCISE  TOLERANCE  1-2 FLIGHT OF STAIRS  WITHOUT SHORTNESS OF BREATH    Anesthesia Alert  NO--Difficult Airway  NO--History of neck surgery or radiation  NO--Limited ROM of neck  NO--History of Malignant hyperthermia  NO--Personal or family history of Pseudocholinesterase deficiency  NO--Prior Anesthesia Complication  NO--Latex Allergy  NO--Loose teeth  NO--History of Rheumatoid Arthritis  NO--ARTMEIO  NO-- BLEEDING RISK  NO--Other_____    As per patient, this is their complete medical and surgical history, including medications both prescribed or over the counter.  Patient verbalized understanding of instructions and was given the opportunity to ask questions and have them answered.  Patient denies any signs or symptoms of COVID 19 and denies contact with known positive individuals.  They have an appointment for COVID testing pre-procedure and acknowledge its time and place.  They were instructed to quarantine pre-procedure, practice exposure control measures, continue to self-monitor and report any concerns to their proceduralist.

## 2023-01-20 ENCOUNTER — LABORATORY RESULT (OUTPATIENT)
Age: 43
End: 2023-01-20

## 2023-01-27 ENCOUNTER — LABORATORY RESULT (OUTPATIENT)
Age: 43
End: 2023-01-27

## 2023-01-30 ENCOUNTER — TRANSCRIPTION ENCOUNTER (OUTPATIENT)
Age: 43
End: 2023-01-30

## 2023-01-30 ENCOUNTER — APPOINTMENT (OUTPATIENT)
Age: 43
End: 2023-01-30
Payer: COMMERCIAL

## 2023-01-30 ENCOUNTER — APPOINTMENT (OUTPATIENT)
Age: 43
End: 2023-01-30

## 2023-01-30 ENCOUNTER — OUTPATIENT (OUTPATIENT)
Dept: OUTPATIENT SERVICES | Facility: HOSPITAL | Age: 43
LOS: 1 days | Discharge: HOME | End: 2023-01-30

## 2023-01-30 VITALS
OXYGEN SATURATION: 99 % | HEART RATE: 83 BPM | TEMPERATURE: 98 F | DIASTOLIC BLOOD PRESSURE: 49 MMHG | RESPIRATION RATE: 13 BRPM | SYSTOLIC BLOOD PRESSURE: 102 MMHG

## 2023-01-30 VITALS — WEIGHT: 203.71 LBS

## 2023-01-30 DIAGNOSIS — M23.91 UNSPECIFIED INTERNAL DERANGEMENT OF RIGHT KNEE: ICD-10-CM

## 2023-01-30 DIAGNOSIS — E66.9 OBESITY, UNSPECIFIED: ICD-10-CM

## 2023-01-30 DIAGNOSIS — M65.861 OTHER SYNOVITIS AND TENOSYNOVITIS, RIGHT LOWER LEG: ICD-10-CM

## 2023-01-30 DIAGNOSIS — M23.221 DERANGEMENT OF POSTERIOR HORN OF MEDIAL MENISCUS DUE TO OLD TEAR OR INJURY, RIGHT KNEE: ICD-10-CM

## 2023-01-30 DIAGNOSIS — E78.00 PURE HYPERCHOLESTEROLEMIA, UNSPECIFIED: ICD-10-CM

## 2023-01-30 DIAGNOSIS — Z87.442 PERSONAL HISTORY OF URINARY CALCULI: ICD-10-CM

## 2023-01-30 DIAGNOSIS — F17.210 NICOTINE DEPENDENCE, CIGARETTES, UNCOMPLICATED: ICD-10-CM

## 2023-01-30 DIAGNOSIS — Z87.2 PERSONAL HISTORY OF DISEASES OF THE SKIN AND SUBCUTANEOUS TISSUE: Chronic | ICD-10-CM

## 2023-01-30 DIAGNOSIS — S83.249A OTHER TEAR OF MEDIAL MENISCUS, CURRENT INJURY, UNSPECIFIED KNEE, INITIAL ENCOUNTER: ICD-10-CM

## 2023-01-30 PROBLEM — S83.209A UNSPECIFIED TEAR OF UNSPECIFIED MENISCUS, CURRENT INJURY, UNSPECIFIED KNEE, INITIAL ENCOUNTER: Chronic | Status: ACTIVE | Noted: 2023-01-03

## 2023-01-30 PROBLEM — F17.200 NICOTINE DEPENDENCE, UNSPECIFIED, UNCOMPLICATED: Chronic | Status: ACTIVE | Noted: 2023-01-03

## 2023-01-30 PROCEDURE — 29881 ARTHRS KNE SRG MNISECTMY M/L: CPT | Mod: RT

## 2023-01-30 RX ORDER — SODIUM CHLORIDE 9 MG/ML
1000 INJECTION, SOLUTION INTRAVENOUS
Refills: 0 | Status: DISCONTINUED | OUTPATIENT
Start: 2023-01-30 | End: 2023-02-13

## 2023-01-30 RX ORDER — ONDANSETRON 8 MG/1
4 TABLET, FILM COATED ORAL ONCE
Refills: 0 | Status: DISCONTINUED | OUTPATIENT
Start: 2023-01-30 | End: 2023-02-13

## 2023-01-30 RX ORDER — HYDROMORPHONE HYDROCHLORIDE 2 MG/ML
0.5 INJECTION INTRAMUSCULAR; INTRAVENOUS; SUBCUTANEOUS
Refills: 0 | Status: DISCONTINUED | OUTPATIENT
Start: 2023-01-30 | End: 2023-01-30

## 2023-01-30 RX ORDER — OXYCODONE AND ACETAMINOPHEN 5; 325 MG/1; MG/1
1 TABLET ORAL
Qty: 12 | Refills: 0
Start: 2023-01-30

## 2023-01-30 RX ORDER — OXYCODONE AND ACETAMINOPHEN 5; 325 MG/1; MG/1
1 TABLET ORAL EVERY 4 HOURS
Refills: 0 | Status: DISCONTINUED | OUTPATIENT
Start: 2023-01-30 | End: 2023-01-30

## 2023-01-30 RX ADMIN — SODIUM CHLORIDE 100 MILLILITER(S): 9 INJECTION, SOLUTION INTRAVENOUS at 14:41

## 2023-01-30 NOTE — ASU DISCHARGE PLAN (ADULT/PEDIATRIC) - PATIENT EDUCATION MATERIALS PROVIED
PAIN MEDICATION/Provider pre-printed instructions given/Pre-printed instructions given for other (specify)

## 2023-01-30 NOTE — ASU DISCHARGE PLAN (ADULT/PEDIATRIC) - CARE PROVIDER_API CALL
Sushil Gonzalez)  [facility]  [address] [place] [address] [place] [address]  Phone: [phone]  Fax: [phone]  Follow Up Time:

## 2023-01-30 NOTE — BRIEF OPERATIVE NOTE - SPECIMENS
normal bowel sounds , no masses palpable , no guarding or rigidity , soft, nontender, nondistended none

## 2023-01-30 NOTE — PRE-ANESTHESIA EVALUATION ADULT - NSANTHOSAYNRD_GEN_A_CORE
No. ARTEMIO screening performed.  STOP BANG Legend: 0-2 = LOW Risk; 3-4 = INTERMEDIATE Risk; 5-8 = HIGH Risk

## 2023-01-30 NOTE — ASU DISCHARGE PLAN (ADULT/PEDIATRIC) - ASU DC SPECIAL INSTRUCTIONSFT
Post Operative Instructions for Knee Surgery    Your Surgery Included  [x ] Menisectomy  [ ] Meniscus Repair					  [x ] Synovectomy/Plica Excision	  [x ] Debridement/Chondroplasty  [ ] Lysis of Adhesions  [ ] ACL reconstruction			  [ ] PCL Reconstruction  [ ] Other:  	  Call our office ([phone]) immediately if you experience any of the following:  •	Excessive bleeding or pus like drainage at the incision site  •	Uncontrollable pain not relieved by pain medication  •	Excessive swelling or redness at the incision site  •	Fever above 101.5 degrees not controlled with Tylenol or Motrin  •	Shortness of Breath  •	Any foul odor or blistering from the surgery site    Pain Management: You were given one or more prescriptions before leaving the hospital. Have the prescriptions filled at a pharmacy on your way home and follow the instructions on the bottles.   Regional Anesthesia Injections (Blocks): You may have been given a regional nerve block either before or after surgery. This may numb your leg for 24-36 hours  	*Proceed with caution when weight bearing on your leg    Diet: Eat a bland diet for the first day after surgery. Progress your diet as tolerated. Constipation may occur with Narcotic usage, contact our office if you are experiencing constipation.    Activity: Limit your activity during the first 48 hours, keep your leg elevated with pillows under your heel. After the first 48 hours at home, increase your activity level based on your symptoms.    Dressing Change: Remove the dressing on the 3rd day. It is normal for some blood to be seen on the dressings. It is also normal for you to see apparent bruising on the skin around your knee when you remove the dressing. If present, leave the steri-strip tape across the incisions. If you are concerned by the drainage or the appearance of your knee, please call one of the numbers listed. Keep covered with Band-Aids/bandages.    Showering: You may shower on the 5th day after surgery if the wound is dry and clean, but do not let the wound soak in water until sutures are removed. Do not submerge in any water until after your postoperative appointment in clinic.    Weight Bearing:						  [x ] Weight bearing as tolerated		  [ ] Nonweight bearing				  [ ] Other:						    Operative Knee Range of Motion  [x] Full range of motion  [ ] ROM 0-90 deg  [ ] Other:    Knee Exercises: You may do these exercises for 2-5 mins five times a day in order to help regain your range of motion.  [x ] Quad Sets: Begin activating your quadriceps muscle by driving your knee downward into full knee extension while seated on a table or bed   with a towel rolled and propped under your heel  [x ] Straight Leg Raise: While daan your quadriceps muscle, lift your fully extended leg to the level of your non-operative knee  [x ] Heel Slides: With the knee straight, slide your heel slowly toward your buttocks, hold at the endpoint for 10-15 seconds, then slowly straighten  [x ] Ankle Pumps: With your knee straight, move your ankle in a "pumping"  fashion to activate your calf and leg muscle     Follow Up: As Scheduled

## 2023-01-30 NOTE — ASU DISCHARGE PLAN (ADULT/PEDIATRIC) - NS MD DC FALL RISK RISK
For information on Fall & Injury Prevention, visit: [email or web address] OR  [email or web address] OR  https://www.cdc.gov/steadi/patient.html

## 2023-01-30 NOTE — CHART NOTE - NSCHARTNOTEFT_GEN_A_CORE
PACU ANESTHESIA ADMISSION NOTE      Procedure: Arthroscopic medial meniscectomy      Post op diagnosis:  MMT (medial meniscus tear)        ____  Intubated  TV:______       Rate: ______      FiO2: ______    __x__  Patent Airway    __x__  Full return of protective reflexes    __x__  Full recovery from anesthesia / back to baseline status    Vitals  HR: 69  BP: 119/66  RR: 20  O2 Sat: 100  Temp: 97.9    Mental Status:  __x__ Awake   ___x__ Alert   _____ Drowsy   _____ Sedated    Nausea/Vomiting:  __x__ NO  ______Yes,   See Post - Op Orders          Pain Scale (0-10):  _____    Treatment: ____ None    __x__ See Post - Op/PCA Orders    Post - Operative Fluids:   ____ Oral   __x__ See Post - Op Orders    Plan: Discharge when criteria met:   _x___Home       _____Floor     _____Critical Care   Other:_________________    Comments: Patient had smooth intraoperative event, no anesthesia complication.

## 2023-02-07 ENCOUNTER — APPOINTMENT (OUTPATIENT)
Dept: ORTHOPEDIC SURGERY | Facility: CLINIC | Age: 43
End: 2023-02-07
Payer: COMMERCIAL

## 2023-02-07 DIAGNOSIS — S83.8X1A SPRAIN OF OTHER SPECIFIED PARTS OF RIGHT KNEE, INITIAL ENCOUNTER: ICD-10-CM

## 2023-02-07 PROCEDURE — 99024 POSTOP FOLLOW-UP VISIT: CPT

## 2023-02-07 NOTE — HISTORY OF PRESENT ILLNESS
[de-identified] : Patient is s/p right knee arthroscopy. \par Doing well.\par Pain controlled.\par \par NAD\par Right knee:\par Incisions healed, c/d/i\par Mild swelling\par ROM 0-120\par Neg Instability\par NVI\par Compartments soft and NT\par \par s/p right knee arthroscopy\par Went over surgery in detail\par Will start PT\par Continue pain control\par fu in 6 weeks\par All questions answered\par

## 2023-03-23 NOTE — ASU PATIENT PROFILE, ADULT - HAVE YOU RECEIVED AT LEAST TWO PFIZER AND/OR MODERNA VACCINATIONS (IN ANY COMBINATION) AND/OR ONE JOHNSON & JOHNSON VACCINATION?
Pain and ortho teams called back and stated that there is nothing that can be done if the catheter line was pulled out. Advised to turn it off, and orders will be placed to discontinue the nerve block.     Yes

## 2023-03-24 ENCOUNTER — APPOINTMENT (OUTPATIENT)
Dept: ORTHOPEDIC SURGERY | Facility: CLINIC | Age: 43
End: 2023-03-24

## 2024-07-13 ENCOUNTER — INPATIENT (INPATIENT)
Facility: HOSPITAL | Age: 44
LOS: 1 days | Discharge: HOME | DRG: 661 | End: 2024-07-15
Attending: UROLOGY | Admitting: UROLOGY
Payer: COMMERCIAL

## 2024-07-13 VITALS
TEMPERATURE: 99 F | DIASTOLIC BLOOD PRESSURE: 87 MMHG | WEIGHT: 210.1 LBS | OXYGEN SATURATION: 100 % | SYSTOLIC BLOOD PRESSURE: 134 MMHG | HEIGHT: 67 IN | RESPIRATION RATE: 18 BRPM | HEART RATE: 90 BPM

## 2024-07-13 DIAGNOSIS — Z87.2 PERSONAL HISTORY OF DISEASES OF THE SKIN AND SUBCUTANEOUS TISSUE: Chronic | ICD-10-CM

## 2024-07-13 LAB
ALBUMIN SERPL ELPH-MCNC: 4.8 G/DL — SIGNIFICANT CHANGE UP (ref 3.5–5.2)
ALP SERPL-CCNC: 98 U/L — SIGNIFICANT CHANGE UP (ref 30–115)
ALT FLD-CCNC: 50 U/L — HIGH (ref 0–41)
ANION GAP SERPL CALC-SCNC: 14 MMOL/L — SIGNIFICANT CHANGE UP (ref 7–14)
APPEARANCE UR: CLEAR — SIGNIFICANT CHANGE UP
AST SERPL-CCNC: 19 U/L — SIGNIFICANT CHANGE UP (ref 0–41)
BACTERIA # UR AUTO: NEGATIVE /HPF — SIGNIFICANT CHANGE UP
BASOPHILS # BLD AUTO: 0.05 K/UL — SIGNIFICANT CHANGE UP (ref 0–0.2)
BASOPHILS NFR BLD AUTO: 0.4 % — SIGNIFICANT CHANGE UP (ref 0–1)
BILIRUB SERPL-MCNC: 0.3 MG/DL — SIGNIFICANT CHANGE UP (ref 0.2–1.2)
BILIRUB UR-MCNC: NEGATIVE — SIGNIFICANT CHANGE UP
BUN SERPL-MCNC: 30 MG/DL — HIGH (ref 10–20)
CALCIUM SERPL-MCNC: 10 MG/DL — SIGNIFICANT CHANGE UP (ref 8.4–10.5)
CAST: 2 /LPF — SIGNIFICANT CHANGE UP (ref 0–4)
CHLORIDE SERPL-SCNC: 102 MMOL/L — SIGNIFICANT CHANGE UP (ref 98–110)
CO2 SERPL-SCNC: 22 MMOL/L — SIGNIFICANT CHANGE UP (ref 17–32)
COLOR SPEC: YELLOW — SIGNIFICANT CHANGE UP
CREAT SERPL-MCNC: 1.8 MG/DL — HIGH (ref 0.7–1.5)
DIFF PNL FLD: ABNORMAL
EGFR: 48 ML/MIN/1.73M2 — LOW
EOSINOPHIL # BLD AUTO: 0.31 K/UL — SIGNIFICANT CHANGE UP (ref 0–0.7)
EOSINOPHIL NFR BLD AUTO: 2.6 % — SIGNIFICANT CHANGE UP (ref 0–8)
GLUCOSE SERPL-MCNC: 96 MG/DL — SIGNIFICANT CHANGE UP (ref 70–99)
GLUCOSE UR QL: NEGATIVE MG/DL — SIGNIFICANT CHANGE UP
HCT VFR BLD CALC: 46.4 % — SIGNIFICANT CHANGE UP (ref 42–52)
HGB BLD-MCNC: 15.9 G/DL — SIGNIFICANT CHANGE UP (ref 14–18)
IMM GRANULOCYTES NFR BLD AUTO: 0.2 % — SIGNIFICANT CHANGE UP (ref 0.1–0.3)
KETONES UR-MCNC: NEGATIVE MG/DL — SIGNIFICANT CHANGE UP
LEUKOCYTE ESTERASE UR-ACNC: NEGATIVE — SIGNIFICANT CHANGE UP
LYMPHOCYTES # BLD AUTO: 27.4 % — SIGNIFICANT CHANGE UP (ref 20.5–51.1)
LYMPHOCYTES # BLD AUTO: 3.29 K/UL — SIGNIFICANT CHANGE UP (ref 1.2–3.4)
MCHC RBC-ENTMCNC: 31.5 PG — HIGH (ref 27–31)
MCHC RBC-ENTMCNC: 34.3 G/DL — SIGNIFICANT CHANGE UP (ref 32–37)
MCV RBC AUTO: 91.9 FL — SIGNIFICANT CHANGE UP (ref 80–94)
MONOCYTES # BLD AUTO: 0.9 K/UL — HIGH (ref 0.1–0.6)
MONOCYTES NFR BLD AUTO: 7.5 % — SIGNIFICANT CHANGE UP (ref 1.7–9.3)
NEUTROPHILS # BLD AUTO: 7.43 K/UL — HIGH (ref 1.4–6.5)
NEUTROPHILS NFR BLD AUTO: 61.9 % — SIGNIFICANT CHANGE UP (ref 42.2–75.2)
NITRITE UR-MCNC: NEGATIVE — SIGNIFICANT CHANGE UP
NRBC # BLD: 0 /100 WBCS — SIGNIFICANT CHANGE UP (ref 0–0)
PH UR: 6 — SIGNIFICANT CHANGE UP (ref 5–8)
PLATELET # BLD AUTO: 360 K/UL — SIGNIFICANT CHANGE UP (ref 130–400)
PMV BLD: 9 FL — SIGNIFICANT CHANGE UP (ref 7.4–10.4)
POTASSIUM SERPL-MCNC: 4.9 MMOL/L — SIGNIFICANT CHANGE UP (ref 3.5–5)
POTASSIUM SERPL-SCNC: 4.9 MMOL/L — SIGNIFICANT CHANGE UP (ref 3.5–5)
PROT SERPL-MCNC: 7.8 G/DL — SIGNIFICANT CHANGE UP (ref 6–8)
PROT UR-MCNC: NEGATIVE MG/DL — SIGNIFICANT CHANGE UP
RBC # BLD: 5.05 M/UL — SIGNIFICANT CHANGE UP (ref 4.7–6.1)
RBC # FLD: 11.9 % — SIGNIFICANT CHANGE UP (ref 11.5–14.5)
RBC CASTS # UR COMP ASSIST: 4 /HPF — SIGNIFICANT CHANGE UP (ref 0–4)
SODIUM SERPL-SCNC: 138 MMOL/L — SIGNIFICANT CHANGE UP (ref 135–146)
SP GR SPEC: >1.03 — HIGH (ref 1–1.03)
SQUAMOUS # UR AUTO: 0 /HPF — SIGNIFICANT CHANGE UP (ref 0–5)
UROBILINOGEN FLD QL: 0.2 MG/DL — SIGNIFICANT CHANGE UP (ref 0.2–1)
WBC # BLD: 12.01 K/UL — HIGH (ref 4.8–10.8)
WBC # FLD AUTO: 12.01 K/UL — HIGH (ref 4.8–10.8)
WBC UR QL: 4 /HPF — SIGNIFICANT CHANGE UP (ref 0–5)

## 2024-07-13 PROCEDURE — 99285 EMERGENCY DEPT VISIT HI MDM: CPT

## 2024-07-13 PROCEDURE — 74177 CT ABD & PELVIS W/CONTRAST: CPT | Mod: 26,MC

## 2024-07-13 RX ORDER — KETOROLAC TROMETHAMINE 30 MG/ML
15 INJECTION, SOLUTION INTRAMUSCULAR ONCE
Refills: 0 | Status: DISCONTINUED | OUTPATIENT
Start: 2024-07-13 | End: 2024-07-13

## 2024-07-13 RX ORDER — DEXTROSE MONOHYDRATE AND SODIUM CHLORIDE 5; .3 G/100ML; G/100ML
1000 INJECTION, SOLUTION INTRAVENOUS ONCE
Refills: 0 | Status: COMPLETED | OUTPATIENT
Start: 2024-07-13 | End: 2024-07-13

## 2024-07-13 RX ORDER — SODIUM CHLORIDE 0.9 % (FLUSH) 0.9 %
1000 SYRINGE (ML) INJECTION ONCE
Refills: 0 | Status: COMPLETED | OUTPATIENT
Start: 2024-07-13 | End: 2024-07-13

## 2024-07-13 RX ADMIN — KETOROLAC TROMETHAMINE 15 MILLIGRAM(S): 30 INJECTION, SOLUTION INTRAMUSCULAR at 21:10

## 2024-07-13 RX ADMIN — DEXTROSE MONOHYDRATE AND SODIUM CHLORIDE 1000 MILLILITER(S): 5; .3 INJECTION, SOLUTION INTRAVENOUS at 23:31

## 2024-07-13 RX ADMIN — Medication 1000 MILLILITER(S): at 21:10

## 2024-07-13 NOTE — ED PROVIDER NOTE - PHYSICAL EXAMINATION
CONSTITUTIONAL: Well-appearing; well-nourished; in no apparent distress.   EYES: PERRL; EOM intact.   ENT: normal nose; no rhinorrhea; normal pharynx with no tonsillar hypertrophy.   NECK: Supple; non-tender; no cervical lymphadenopathy.  CARDIOVASCULAR: Normal S1, S2; no murmurs, rubs, or gallops.   RESPIRATORY: Normal chest excursion with respiration; breath sounds clear and equal bilaterally; no wheezes, rhonchi, or rales.  GI/: Normal bowel sounds; non-distended; non-tender; no palpable organomegaly.   MS: No evidence of trauma or deformity. Normal ROM in all four extremities; non-tender to palpation; distal pulses are normal.   SKIN: Normal for age and race; warm; dry; good turgor; no apparent lesions or exudate.   NEURO/PSYCH: A & O x 4; grossly unremarkable. mood and manner are appropriate. Grooming and personal hygiene are appropriate.

## 2024-07-13 NOTE — ED ADULT NURSE NOTE - CAS TRG GEN SKIN CONDITION
[Appropriately responsive] : appropriately responsive [Alert] : alert [No Acute Distress] : no acute distress [No Lymphadenopathy] : no lymphadenopathy [Regular Rate Rhythm] : regular rate rhythm [No Murmurs] : no murmurs [Clear to Auscultation B/L] : clear to auscultation bilaterally [Soft] : soft [Non-tender] : non-tender [Non-distended] : non-distended [No HSM] : No HSM [No Lesions] : no lesions [No Mass] : no mass [Oriented x3] : oriented x3 [Examination Of The Breasts] : a normal appearance [No Masses] : no breast masses were palpable [Vulvar Atrophy] : vulvar atrophy [Labia Majora] : normal [Labia Minora] : normal [Atrophy] : atrophy [Normal] : normal [Uterine Adnexae] : normal Warm

## 2024-07-13 NOTE — ED ADULT NURSE NOTE - OBJECTIVE STATEMENT
Pt presents to the ED complaining of abdominal pain. Pt states his doctor told him he has gallstones and to come to ED for evaluation.

## 2024-07-13 NOTE — ED PROVIDER NOTE - ATTENDING APP SHARED VISIT CONTRIBUTION OF CARE
43-year-old male presents for evaluation of right-sided abdominal pain for 1 week.  Patient states he was in [place] and started to have this pain, was seen in emergency department twice and divided told he had right-sided stones.  Was offered a procedure but had to pay out-of-pocket $13,000 so opted to fly home instead.  Patient states pain has been continuing.  Denies any dysuria, hematuria, frequency, fevers, chills.  No chest pain, shortness of breath, vomiting, diarrhea or weakness.  Denies any trauma or falls.    VITAL SIGNS: noted  CONSTITUTIONAL: Well-developed; well-nourished; in no acute distress  HEAD: Normocephalic; atraumatic  EYES: PERRL, EOM intact; conjunctiva and sclera clear  ENT: No nasal discharge; airway clear. MMM  NECK: Supple; non tender.    CARD: S1, S2 normal; no murmurs, gallops, or rubs. Regular rate and rhythm  RESP: CTAB/L, no wheezes, rales or rhonchi  ABD: Normal bowel sounds; soft; non-distended; mild RUQ/RLQ abdominal tenderness, no rebound or guarding, no  CVA tenderness  EXT: Normal ROM. No calf tenderness or edema. Distal pulses intact  NEURO: Alert, oriented. Grossly unremarkable. No focal deficits  SKIN: Skin exam is warm and dry, no acute rash

## 2024-07-13 NOTE — ED ADULT TRIAGE NOTE - CHIEF COMPLAINT QUOTE
I have a gallstone, pain started last Friday[facility]

## 2024-07-13 NOTE — ED PROVIDER NOTE - CLINICAL SUMMARY MEDICAL DECISION MAKING FREE TEXT BOX
pt evaluated for right sided abd pain, labs and imaging reviewed, urology consulted for large obstructing stone and pt admitted to urology service for further mgmt and tx

## 2024-07-13 NOTE — ED PROVIDER NOTE - OBJECTIVE STATEMENT
43-year-old male history of dyslipidemia, kidney stones presenting to the ER with right-sided flank pain x > 1 week.  Patient was on vacation in [place] 1 week ago started having right-sided abdominal pain was seen in the emergency room and told he had kidney stone.  He denies any fever, chills, nausea, vomiting, dysuria/frequency/hematuria.

## 2024-07-13 NOTE — ED ADULT NURSE NOTE - CHIEF COMPLAINT QUOTE
I have a gallstone, pain started last Friday, [facility]

## 2024-07-14 DIAGNOSIS — N20.0 CALCULUS OF KIDNEY: ICD-10-CM

## 2024-07-14 DIAGNOSIS — N13.2 HYDRONEPHROSIS WITH RENAL AND URETERAL CALCULOUS OBSTRUCTION: ICD-10-CM

## 2024-07-14 LAB
ANION GAP SERPL CALC-SCNC: 11 MMOL/L — SIGNIFICANT CHANGE UP (ref 7–14)
BUN SERPL-MCNC: 29 MG/DL — HIGH (ref 10–20)
CALCIUM SERPL-MCNC: 9.4 MG/DL — SIGNIFICANT CHANGE UP (ref 8.4–10.5)
CHLORIDE SERPL-SCNC: 103 MMOL/L — SIGNIFICANT CHANGE UP (ref 98–110)
CO2 SERPL-SCNC: 25 MMOL/L — SIGNIFICANT CHANGE UP (ref 17–32)
CREAT SERPL-MCNC: 1.7 MG/DL — HIGH (ref 0.7–1.5)
EGFR: 51 ML/MIN/1.73M2 — LOW
GLUCOSE SERPL-MCNC: 138 MG/DL — HIGH (ref 70–99)
POTASSIUM SERPL-MCNC: 5.4 MMOL/L — HIGH (ref 3.5–5)
POTASSIUM SERPL-SCNC: 5.4 MMOL/L — HIGH (ref 3.5–5)
SODIUM SERPL-SCNC: 139 MMOL/L — SIGNIFICANT CHANGE UP (ref 135–146)

## 2024-07-14 PROCEDURE — C1758: CPT

## 2024-07-14 PROCEDURE — 74420 UROGRAPHY RTRGR +-KUB: CPT | Mod: 26,RT

## 2024-07-14 PROCEDURE — 93005 ELECTROCARDIOGRAM TRACING: CPT

## 2024-07-14 PROCEDURE — 36415 COLL VENOUS BLD VENIPUNCTURE: CPT

## 2024-07-14 PROCEDURE — 52332 CYSTOSCOPY AND TREATMENT: CPT | Mod: RT

## 2024-07-14 PROCEDURE — 93010 ELECTROCARDIOGRAM REPORT: CPT

## 2024-07-14 PROCEDURE — 71045 X-RAY EXAM CHEST 1 VIEW: CPT | Mod: 26

## 2024-07-14 PROCEDURE — 99223 1ST HOSP IP/OBS HIGH 75: CPT | Mod: 25,57

## 2024-07-14 PROCEDURE — C2617: CPT

## 2024-07-14 PROCEDURE — 72170 X-RAY EXAM OF PELVIS: CPT

## 2024-07-14 PROCEDURE — 71045 X-RAY EXAM CHEST 1 VIEW: CPT

## 2024-07-14 PROCEDURE — C1769: CPT

## 2024-07-14 PROCEDURE — 80048 BASIC METABOLIC PNL TOTAL CA: CPT

## 2024-07-14 RX ORDER — ONDANSETRON HYDROCHLORIDE 2 MG/ML
4 INJECTION INTRAMUSCULAR; INTRAVENOUS ONCE
Refills: 0 | Status: DISCONTINUED | OUTPATIENT
Start: 2024-07-14 | End: 2024-07-14

## 2024-07-14 RX ORDER — ONDANSETRON HYDROCHLORIDE 2 MG/ML
4 INJECTION INTRAMUSCULAR; INTRAVENOUS EVERY 6 HOURS
Refills: 0 | Status: DISCONTINUED | OUTPATIENT
Start: 2024-07-14 | End: 2024-07-14

## 2024-07-14 RX ORDER — SODIUM ZIRCONIUM CYCLOSILICATE 10 G/10G
10 POWDER, FOR SUSPENSION ORAL ONCE
Refills: 0 | Status: COMPLETED | OUTPATIENT
Start: 2024-07-14 | End: 2024-07-14

## 2024-07-14 RX ORDER — PHENAZOPYRIDINE HCL 200 MG/1
2 TABLET ORAL
Qty: 18 | Refills: 0
Start: 2024-07-14 | End: 2024-07-16

## 2024-07-14 RX ORDER — TAMSULOSIN HYDROCHLORIDE 0.4 MG/1
0.4 CAPSULE ORAL AT BEDTIME
Refills: 0 | Status: DISCONTINUED | OUTPATIENT
Start: 2024-07-14 | End: 2024-07-15

## 2024-07-14 RX ORDER — DEXTROSE MONOHYDRATE AND SODIUM CHLORIDE 5; .3 G/100ML; G/100ML
1000 INJECTION, SOLUTION INTRAVENOUS
Refills: 0 | Status: DISCONTINUED | OUTPATIENT
Start: 2024-07-14 | End: 2024-07-14

## 2024-07-14 RX ORDER — TAMSULOSIN HYDROCHLORIDE 0.4 MG/1
0.4 CAPSULE ORAL AT BEDTIME
Refills: 0 | Status: DISCONTINUED | OUTPATIENT
Start: 2024-07-14 | End: 2024-07-14

## 2024-07-14 RX ORDER — MORPHINE SULFATE 100 MG/1
2 TABLET, EXTENDED RELEASE ORAL EVERY 4 HOURS
Refills: 0 | Status: DISCONTINUED | OUTPATIENT
Start: 2024-07-14 | End: 2024-07-14

## 2024-07-14 RX ORDER — PHENAZOPYRIDINE HCL 200 MG/1
200 TABLET ORAL EVERY 8 HOURS
Refills: 0 | Status: DISCONTINUED | OUTPATIENT
Start: 2024-07-14 | End: 2024-07-15

## 2024-07-14 RX ORDER — PHENAZOPYRIDINE HCL 200 MG/1
200 TABLET ORAL EVERY 8 HOURS
Refills: 0 | Status: DISCONTINUED | OUTPATIENT
Start: 2024-07-14 | End: 2024-07-14

## 2024-07-14 RX ORDER — SULFAMETHOXAZOLE AND TRIMETHOPRIM 800; 160 MG/1; MG/1
1 TABLET ORAL
Qty: 4 | Refills: 0
Start: 2024-07-14 | End: 2024-07-15

## 2024-07-14 RX ORDER — KETOROLAC TROMETHAMINE 30 MG/ML
30 INJECTION, SOLUTION INTRAMUSCULAR ONCE
Refills: 0 | Status: DISCONTINUED | OUTPATIENT
Start: 2024-07-14 | End: 2024-07-14

## 2024-07-14 RX ORDER — DEXTROSE MONOHYDRATE AND SODIUM CHLORIDE 5; .3 G/100ML; G/100ML
1000 INJECTION, SOLUTION INTRAVENOUS
Refills: 0 | Status: DISCONTINUED | OUTPATIENT
Start: 2024-07-14 | End: 2024-07-15

## 2024-07-14 RX ORDER — TAMSULOSIN HYDROCHLORIDE 0.4 MG/1
1 CAPSULE ORAL
Qty: 14 | Refills: 0
Start: 2024-07-14 | End: 2024-07-27

## 2024-07-14 RX ADMIN — DEXTROSE MONOHYDRATE AND SODIUM CHLORIDE 75 MILLILITER(S): 5; .3 INJECTION, SOLUTION INTRAVENOUS at 00:58

## 2024-07-14 RX ADMIN — SODIUM ZIRCONIUM CYCLOSILICATE 10 GRAM(S): 10 POWDER, FOR SUSPENSION ORAL at 21:36

## 2024-07-14 RX ADMIN — KETOROLAC TROMETHAMINE 30 MILLIGRAM(S): 30 INJECTION, SOLUTION INTRAMUSCULAR at 09:50

## 2024-07-14 RX ADMIN — KETOROLAC TROMETHAMINE 30 MILLIGRAM(S): 30 INJECTION, SOLUTION INTRAMUSCULAR at 09:24

## 2024-07-14 RX ADMIN — TAMSULOSIN HYDROCHLORIDE 0.4 MILLIGRAM(S): 0.4 CAPSULE ORAL at 21:36

## 2024-07-14 RX ADMIN — PHENAZOPYRIDINE HCL 200 MILLIGRAM(S): 200 TABLET ORAL at 21:39

## 2024-07-14 NOTE — H&P ADULT - NS ATTEND AMEND GEN_ALL_CORE FT
Patient seen and examined CT abdomen pelvis images reviewed by me demonstrating multiple R ureteral stones including 1.4 cm proximal near mid ureteral calculus and hydroureteronephrosis.  Patient has acute kidney injury and worsening flank pain.  I explained to the patient that he has a low chance of passing such a large stone.  He elects undergo cystoscopy and right ureteral stent insertion.  Understands the risk the procedure including infection bleeding injury to the urinary tract and need for additional procedures.  He understands importance of follow-up for definitive management of stone.

## 2024-07-14 NOTE — H&P ADULT - PROBLEM SELECTOR PLAN 1
NPO   IV hydration  Flomax 0.4mg QHS  Strain urine  Will transfer to [facility] for Cysto, Laser lithotripsy and stent placement  Will notify [facility] site PA of plan NPO   IV hydration  Flomax 0.4mg QHS  Strain urine  Will transfer to [facility] for Cysto, stent placement  Will notify [facility] site PA of plan

## 2024-07-14 NOTE — DISCHARGE NOTE NURSING/CASE MANAGEMENT/SOCIAL WORK - PATIENT PORTAL LINK FT
You can access the FollowMyHealth Patient Portal offered by [facility] by registering at the following website: [email or web address]. By joining [employer]’s FollowMyHealth portal, you will also be able to view your health information using other applications (apps) compatible with our system.

## 2024-07-14 NOTE — CHART NOTE - NSCHARTNOTEFT_GEN_A_CORE
PACU ANESTHESIA ADMISSION NOTE      Procedure: Cystoscopy, Retrograde pyelogram, Right Stent Placement  Post op diagnosis:  Right Ureteral Stone    ____  Intubated  TV:______       Rate: ______      FiO2: ______    __x__  Patent Airway    ___x_  Full return of protective reflexes    __x__  Full recovery from anesthesia / back to baseline     Vitals:   T: 97.3           R:  17             BP: 96/54              Sat: 97%                   P: 71      Mental Status:  __x__ Awake   ___x__ Alert   _____ Drowsy   _____ Sedated    Nausea/Vomiting:  __x__ NO  ______Yes,   See Post - Op Orders          Pain Scale (0-10):  _____    Treatment: ____ None    __x__ See Post - Op/PCA Orders    Post - Operative Fluids:   ____ Oral   __x__ See Post - Op Orders    Plan: Discharge:   ____Home       __x___Floor     _____Critical Care    _____  Other:_________________

## 2024-07-14 NOTE — H&P ADULT - NSHPPHYSICALEXAM_GEN_ALL_CORE
Pt is awake and alert no distress  PERRLA, normocephalic  Neck supple no palpable masses  Lungs clear to auscultation  Heart rate ragular  Abdomen soft  + Right CVA tenderness  Moving all four extremities, no calf tenderness

## 2024-07-14 NOTE — DISCHARGE NOTE PROVIDER - CARE PROVIDER_API CALL
Manish Alaniz  Urology  [address] [place] [id number]  Phone: [phone]  Fax: [phone]  Follow Up Time:

## 2024-07-14 NOTE — H&P ADULT - ASSESSMENT
Right Hydronephrosis secondary to 3 ureteral calculi  The largest is 14x8mm and this will require laser lithotripsy which  currently is only available at the [facility]  Spoke with Dr Alaniz, will transfer to [facility] for procedure Right Hydronephrosis secondary to 3 ureteral calculi  The largest is 14x8mm.  Spoke with Dr Alaniz, will transfer to [facility] for procedure

## 2024-07-14 NOTE — H&P ADULT - NSICDXPASTMEDICALHX_GEN_ALL_CORE_FT
No PAST MEDICAL HISTORY:  High cholesterol     Kidney stone     Obese     Rectal abscess     Smoker     Torn meniscus

## 2024-07-14 NOTE — DISCHARGE NOTE PROVIDER - HOSPITAL COURSE
p/w rt renal colic     taken to OR     sp cysto right ureteral stent insertion, RPG  none  6x26 JJ R ureteral stent      d/c home   with bactrim / flomax / pyridium     f/u in office p/w rt renal colic     taken to OR     sp cysto right ureteral stent insertion, RPG  none  6x26 JJ R ureteral stent    #Elevated K  - one time dose of lokelma given  - K 4.7    d/c home   with bactrim / flomax / pyridium     f/u in office

## 2024-07-14 NOTE — DISCHARGE NOTE PROVIDER - NSDCCPCAREPLAN_GEN_ALL_CORE_FT
PRINCIPAL DISCHARGE DIAGNOSIS  Diagnosis: Nephrolithiasis  Assessment and Plan of Treatment: Renal Colic  WHAT YOU NEED TO KNOW:  Renal colic is severe pain in your lower back or sides. The pain is usually on one side, but may be on both sides of your lower back. Renal colic may start quickly, come and go, and become worse over time. Renal colic is caused by a blockage in your urinary tract. The most common cause of a blockage is a kidney stone. Blood clots, ureter spasms, and dead tissue may also block your urinary tract.     DISCHARGE INSTRUCTIONS:  Return to the emergency department if:   You cannot stop vomiting.  You see new or increased bleeding when you urinate.  You are urinating less than usual, or not at all.  Your pain is not getting better even after treatment.  Contact your healthcare provider if: You have fever.  Take your medicine as directed. Contact your healthcare provider if you think your medicine is not helping or if you have side effects.   Drink liquids as directed to help decrease pain and flush blockages from your urinary tract.   Strain your urine every time you urinate. Urinate into a strainer Look for Stones in the Filter   Avoid activity in hot temperatures. Heat may cause you to become dehydrated and urinate less.  Follow up with your healthcare provider as directed

## 2024-07-14 NOTE — DISCHARGE NOTE PROVIDER - NSDCFUSCHEDAPPT_GEN_ALL_CORE_FT
Manish Alaniz Physician [facility]  UROLOGY [address] [facility]  Scheduled Appointment: 07/18/2024

## 2024-07-14 NOTE — DISCHARGE NOTE PROVIDER - NSDCMRMEDTOKEN_GEN_ALL_CORE_FT
Bactrim  mg-160 mg oral tablet: 1 tab(s) orally 2 times a day  phenazopyridine 100 mg oral tablet: 2 tab(s) orally every 8 hours urine may turn orange color  tamsulosin 0.4 mg oral capsule: 1 cap(s) orally once a day

## 2024-07-14 NOTE — H&P ADULT - NSHPLABSRESULTS_GEN_ALL_CORE
< from: CT Abdomen and Pelvis w/ IV Cont (07.13.24 @ 21:28) >      ACC: [id number] EXAM:  CT ABDOMEN AND PELVIS IC   ORDERED BY: MATT RUIZ     PROCEDURE DATE:  07/13/2024          INTERPRETATION:  CLINICAL HISTORY / REASON FOR EXAM: Abdominal pain.    TECHNIQUE: Contiguous axial CT images were obtained from the lower chest   to the pubic symphysis following administration of 95 mL Omnipaque 350   intravenous contrast, 5 mL discarded. Oral contrast was not administered.   Reformatted images in the coronal and sagittal planes were acquired.    COMPARISONCT: CT abdomen and pelvis from March 7, 2020    OTHER STUDIES USED FOR CORRELATION: None.      FINDINGS:    LOWER CHEST: Unremarkable.    HEPATOBILIARY: Hepatic steatosis.    SPLEEN: Unremarkable.    PANCREAS: Unremarkable.    ADRENAL GLANDS: Unremarkable.    KIDNEYS: Obstructing calculus within the right mid ureter measuring 1.4 x   0.7 x 0.8 cm HU = 444 (series 5, image 207). Second calculus within the   distal right ureter measuring 0.3 x 0.2 x 0.2 cm HU = 282 (series 5,   image 334). Calculusat the right UVJ measuring 0.5 x 0.4 x 0.3 cm HU =   418 (series 5, image 347). Delayed right nephrogram and perinephric   stranding. Moderate right hydroureter.    ABDOMINOPELVIC NODES: Unremarkable.    PELVIC ORGANS: Unremarkable.    PERITONEUM/MESENTERY/BOWEL: No bowel obstruction, ascites or   intraperitoneal free air. Normal caliber appendix.    BONES/SOFT TISSUES: Degenerative changes of the thoracolumbar spine.   Small fat-containing umbilical hernia.    VASCULAR: Aorta is normal in caliber.      IMPRESSION:    A series of 3 obstructing calculi within the right ureter    First calculus within the right mid ureter measuring 1.4 x 0.7 x 0.8 cm  Second calculus within the distal right ureter measuring 0.3 x 0.2 x 0.2   cm  Third calculus at the right UVJ measuring 0.5 x 0.4 x 0.3 cm.    --- End of Report ---            WONG LOZADA MD; Attending Radiologist  This document has been electronically signed. Jul 13 2024  9:40PM    < end of copied text >

## 2024-07-14 NOTE — PRE-OP CHECKLIST - BMI (KG/M2)
"32.9

## 2024-07-14 NOTE — H&P ADULT - HISTORY OF PRESENT ILLNESS
Pt presents with right sided flank pain x 1 week, pain sharp and intermittent. Pain is located in right flank and radiates towards the groin.  Pt denies hx of kidney stones. Pt denies any fever or dysuria

## 2024-07-15 VITALS
TEMPERATURE: 98 F | RESPIRATION RATE: 18 BRPM | OXYGEN SATURATION: 99 % | HEART RATE: 75 BPM | DIASTOLIC BLOOD PRESSURE: 79 MMHG | SYSTOLIC BLOOD PRESSURE: 151 MMHG

## 2024-07-15 LAB
ANION GAP SERPL CALC-SCNC: 13 MMOL/L — SIGNIFICANT CHANGE UP (ref 7–14)
BUN SERPL-MCNC: 29 MG/DL — HIGH (ref 10–20)
CALCIUM SERPL-MCNC: 9.5 MG/DL — SIGNIFICANT CHANGE UP (ref 8.4–10.5)
CHLORIDE SERPL-SCNC: 106 MMOL/L — SIGNIFICANT CHANGE UP (ref 98–110)
CO2 SERPL-SCNC: 25 MMOL/L — SIGNIFICANT CHANGE UP (ref 17–32)
CREAT SERPL-MCNC: 1.5 MG/DL — SIGNIFICANT CHANGE UP (ref 0.7–1.5)
EGFR: 60 ML/MIN/1.73M2 — SIGNIFICANT CHANGE UP
GLUCOSE SERPL-MCNC: 93 MG/DL — SIGNIFICANT CHANGE UP (ref 70–99)
POTASSIUM SERPL-MCNC: 4.7 MMOL/L — SIGNIFICANT CHANGE UP (ref 3.5–5)
POTASSIUM SERPL-SCNC: 4.7 MMOL/L — SIGNIFICANT CHANGE UP (ref 3.5–5)
SODIUM SERPL-SCNC: 144 MMOL/L — SIGNIFICANT CHANGE UP (ref 135–146)

## 2024-07-15 RX ADMIN — PHENAZOPYRIDINE HCL 200 MILLIGRAM(S): 200 TABLET ORAL at 05:17

## 2024-07-15 NOTE — CHART NOTE - NSCHARTNOTEFT_GEN_A_CORE
Patient seen at bedside. No acute complaints at time. No acute events noted overnight. Denies fever, flank pain, hematuria.     AM labs noted, K wnl  VSS    Will dc home on oral abx w/ outpatient FU w/ Dr. Alaniz

## 2024-07-18 ENCOUNTER — APPOINTMENT (OUTPATIENT)
Dept: UROLOGY | Facility: CLINIC | Age: 44
End: 2024-07-18
Payer: COMMERCIAL

## 2024-07-18 VITALS
HEART RATE: 101 BPM | HEIGHT: 67 IN | SYSTOLIC BLOOD PRESSURE: 127 MMHG | BODY MASS INDEX: 32.96 KG/M2 | WEIGHT: 210 LBS | OXYGEN SATURATION: 99 % | DIASTOLIC BLOOD PRESSURE: 77 MMHG

## 2024-07-18 DIAGNOSIS — Z78.9 OTHER SPECIFIED HEALTH STATUS: ICD-10-CM

## 2024-07-18 DIAGNOSIS — N17.9 ACUTE KIDNEY FAILURE, UNSPECIFIED: ICD-10-CM

## 2024-07-18 DIAGNOSIS — F17.210 NICOTINE DEPENDENCE, CIGARETTES, UNCOMPLICATED: ICD-10-CM

## 2024-07-18 DIAGNOSIS — N20.1 CALCULUS OF URETER: ICD-10-CM

## 2024-07-18 DIAGNOSIS — Z82.49 FAMILY HISTORY OF ISCHEMIC HEART DISEASE AND OTHER DISEASES OF THE CIRCULATORY SYSTEM: ICD-10-CM

## 2024-07-18 DIAGNOSIS — N13.30 UNSPECIFIED HYDRONEPHROSIS: ICD-10-CM

## 2024-07-18 PROCEDURE — G2211 COMPLEX E/M VISIT ADD ON: CPT | Mod: NC

## 2024-07-18 PROCEDURE — 99215 OFFICE O/P EST HI 40 MIN: CPT

## 2024-07-19 ENCOUNTER — TRANSCRIPTION ENCOUNTER (OUTPATIENT)
Age: 44
End: 2024-07-19

## 2024-07-22 DIAGNOSIS — E87.5 HYPERKALEMIA: ICD-10-CM

## 2024-07-22 DIAGNOSIS — E78.5 HYPERLIPIDEMIA, UNSPECIFIED: ICD-10-CM

## 2024-07-22 DIAGNOSIS — R10.11 RIGHT UPPER QUADRANT PAIN: ICD-10-CM

## 2024-07-22 DIAGNOSIS — N13.2 HYDRONEPHROSIS WITH RENAL AND URETERAL CALCULOUS OBSTRUCTION: ICD-10-CM

## 2024-08-23 ENCOUNTER — OUTPATIENT (OUTPATIENT)
Dept: OUTPATIENT SERVICES | Facility: HOSPITAL | Age: 44
LOS: 1 days | End: 2024-08-23
Payer: COMMERCIAL

## 2024-08-23 VITALS
TEMPERATURE: 98 F | OXYGEN SATURATION: 99 % | RESPIRATION RATE: 16 BRPM | SYSTOLIC BLOOD PRESSURE: 145 MMHG | HEIGHT: 66.5 IN | DIASTOLIC BLOOD PRESSURE: 81 MMHG | WEIGHT: 210.54 LBS | HEART RATE: 73 BPM

## 2024-08-23 DIAGNOSIS — Z01.818 ENCOUNTER FOR OTHER PREPROCEDURAL EXAMINATION: ICD-10-CM

## 2024-08-23 DIAGNOSIS — N20.2 CALCULUS OF KIDNEY WITH CALCULUS OF URETER: ICD-10-CM

## 2024-08-23 DIAGNOSIS — Z87.2 PERSONAL HISTORY OF DISEASES OF THE SKIN AND SUBCUTANEOUS TISSUE: Chronic | ICD-10-CM

## 2024-08-23 LAB
ALBUMIN SERPL ELPH-MCNC: 4.6 G/DL — SIGNIFICANT CHANGE UP (ref 3.5–5.2)
ALP SERPL-CCNC: 85 U/L — SIGNIFICANT CHANGE UP (ref 30–115)
ALT FLD-CCNC: 34 U/L — SIGNIFICANT CHANGE UP (ref 0–41)
ANION GAP SERPL CALC-SCNC: 12 MMOL/L — SIGNIFICANT CHANGE UP (ref 7–14)
APPEARANCE UR: CLEAR — SIGNIFICANT CHANGE UP
AST SERPL-CCNC: 16 U/L — SIGNIFICANT CHANGE UP (ref 0–41)
BACTERIA # UR AUTO: NEGATIVE /HPF — SIGNIFICANT CHANGE UP
BASOPHILS # BLD AUTO: 0.06 K/UL — SIGNIFICANT CHANGE UP (ref 0–0.2)
BASOPHILS NFR BLD AUTO: 0.6 % — SIGNIFICANT CHANGE UP (ref 0–1)
BILIRUB SERPL-MCNC: 0.3 MG/DL — SIGNIFICANT CHANGE UP (ref 0.2–1.2)
BILIRUB UR-MCNC: NEGATIVE — SIGNIFICANT CHANGE UP
BUN SERPL-MCNC: 14 MG/DL — SIGNIFICANT CHANGE UP (ref 10–20)
CALCIUM SERPL-MCNC: 9.4 MG/DL — SIGNIFICANT CHANGE UP (ref 8.4–10.5)
CAST: 1 /LPF — SIGNIFICANT CHANGE UP (ref 0–4)
CHLORIDE SERPL-SCNC: 105 MMOL/L — SIGNIFICANT CHANGE UP (ref 98–110)
CO2 SERPL-SCNC: 24 MMOL/L — SIGNIFICANT CHANGE UP (ref 17–32)
COLOR SPEC: YELLOW — SIGNIFICANT CHANGE UP
CREAT SERPL-MCNC: 1 MG/DL — SIGNIFICANT CHANGE UP (ref 0.7–1.5)
DIFF PNL FLD: ABNORMAL
EGFR: 96 ML/MIN/1.73M2 — SIGNIFICANT CHANGE UP
EOSINOPHIL # BLD AUTO: 0.21 K/UL — SIGNIFICANT CHANGE UP (ref 0–0.7)
EOSINOPHIL NFR BLD AUTO: 2.2 % — SIGNIFICANT CHANGE UP (ref 0–8)
GLUCOSE SERPL-MCNC: 89 MG/DL — SIGNIFICANT CHANGE UP (ref 70–99)
GLUCOSE UR QL: NEGATIVE MG/DL — SIGNIFICANT CHANGE UP
HCT VFR BLD CALC: 43.2 % — SIGNIFICANT CHANGE UP (ref 42–52)
HGB BLD-MCNC: 14.6 G/DL — SIGNIFICANT CHANGE UP (ref 14–18)
IMM GRANULOCYTES NFR BLD AUTO: 0.3 % — SIGNIFICANT CHANGE UP (ref 0.1–0.3)
KETONES UR-MCNC: NEGATIVE MG/DL — SIGNIFICANT CHANGE UP
LEUKOCYTE ESTERASE UR-ACNC: ABNORMAL
LYMPHOCYTES # BLD AUTO: 2.83 K/UL — SIGNIFICANT CHANGE UP (ref 1.2–3.4)
LYMPHOCYTES # BLD AUTO: 29.9 % — SIGNIFICANT CHANGE UP (ref 20.5–51.1)
MCHC RBC-ENTMCNC: 30.2 PG — SIGNIFICANT CHANGE UP (ref 27–31)
MCHC RBC-ENTMCNC: 33.8 G/DL — SIGNIFICANT CHANGE UP (ref 32–37)
MCV RBC AUTO: 89.4 FL — SIGNIFICANT CHANGE UP (ref 80–94)
MONOCYTES # BLD AUTO: 0.56 K/UL — SIGNIFICANT CHANGE UP (ref 0.1–0.6)
MONOCYTES NFR BLD AUTO: 5.9 % — SIGNIFICANT CHANGE UP (ref 1.7–9.3)
NEUTROPHILS # BLD AUTO: 5.78 K/UL — SIGNIFICANT CHANGE UP (ref 1.4–6.5)
NEUTROPHILS NFR BLD AUTO: 61.1 % — SIGNIFICANT CHANGE UP (ref 42.2–75.2)
NITRITE UR-MCNC: NEGATIVE — SIGNIFICANT CHANGE UP
NRBC # BLD: 0 /100 WBCS — SIGNIFICANT CHANGE UP (ref 0–0)
PH UR: 6 — SIGNIFICANT CHANGE UP (ref 5–8)
PLATELET # BLD AUTO: 322 K/UL — SIGNIFICANT CHANGE UP (ref 130–400)
PMV BLD: 9.7 FL — SIGNIFICANT CHANGE UP (ref 7.4–10.4)
POTASSIUM SERPL-MCNC: 4.2 MMOL/L — SIGNIFICANT CHANGE UP (ref 3.5–5)
POTASSIUM SERPL-SCNC: 4.2 MMOL/L — SIGNIFICANT CHANGE UP (ref 3.5–5)
PROT SERPL-MCNC: 7.3 G/DL — SIGNIFICANT CHANGE UP (ref 6–8)
PROT UR-MCNC: SIGNIFICANT CHANGE UP MG/DL
RBC # BLD: 4.83 M/UL — SIGNIFICANT CHANGE UP (ref 4.7–6.1)
RBC # FLD: 12.5 % — SIGNIFICANT CHANGE UP (ref 11.5–14.5)
RBC CASTS # UR COMP ASSIST: 40 /HPF — HIGH (ref 0–4)
SODIUM SERPL-SCNC: 141 MMOL/L — SIGNIFICANT CHANGE UP (ref 135–146)
SP GR SPEC: 1.01 — SIGNIFICANT CHANGE UP (ref 1–1.03)
SQUAMOUS # UR AUTO: 1 /HPF — SIGNIFICANT CHANGE UP (ref 0–5)
UROBILINOGEN FLD QL: 0.2 MG/DL — SIGNIFICANT CHANGE UP (ref 0.2–1)
WBC # BLD: 9.47 K/UL — SIGNIFICANT CHANGE UP (ref 4.8–10.8)
WBC # FLD AUTO: 9.47 K/UL — SIGNIFICANT CHANGE UP (ref 4.8–10.8)
WBC UR QL: 25 /HPF — HIGH (ref 0–5)

## 2024-08-23 PROCEDURE — 85025 COMPLETE CBC W/AUTO DIFF WBC: CPT

## 2024-08-23 PROCEDURE — 87086 URINE CULTURE/COLONY COUNT: CPT

## 2024-08-23 PROCEDURE — 80053 COMPREHEN METABOLIC PANEL: CPT

## 2024-08-23 PROCEDURE — 36415 COLL VENOUS BLD VENIPUNCTURE: CPT

## 2024-08-23 PROCEDURE — 81001 URINALYSIS AUTO W/SCOPE: CPT

## 2024-08-23 PROCEDURE — 99214 OFFICE O/P EST MOD 30 MIN: CPT | Mod: 25

## 2024-08-23 NOTE — H&P PST ADULT - REASON FOR ADMISSION
Case Type: OP Block TimeSuite: [facility] Manish [facility]iz  Confirmed Surgery DateTime: 09- - 0:00PAST DateTime: 08- - 14:30Procedure: CYSTOSCOPY, RIGHT URETEROSCOPY LASER LITHOTRIPSY URETERAL STENT EXCHANGE  ERP?: NoLaterality: RightLength of Procedure: 60 Minutes  Anesthesia Type: General

## 2024-08-23 NOTE — H&P PST ADULT - HISTORY OF PRESENT ILLNESS
44y Male presents today for presurgical testing for CYSTOSCOPY, RIGHT URETEROSCOPY LASER LITHOTRIPSY URETERAL STENT EXCHANGE.  Patient/guardian denies any CP, palpitations, SOB, cough, or dysuria. No recent URI or UTI.  Stated exercise tolerance is FOS  ARTEMIO screen reviewed    Patient/guardian denies any recent personal exposure to COVID19. Denies any sick contacts. Patient/guardian denies travel within the past 30 days. Patient was instructed to quarantine until after procedure.    Anesthesia Alert  NO--Difficult Airway  NO--History of neck surgery or radiation  NO--Limited ROM of neck  NO--History of Malignant hyperthermia  NO--Personal or family history of Pseudocholinesterase deficiency.  NO--Prior Anesthesia Complication  NO--Latex Allergy  NO--Loose teeth  NO--History of Rheumatoid Arthritis  NO--ARTEMIO  NO--Bleeding risk  NO--Other_____    D[employer] Activity Status Index (DASI) from [address]  on 8/23/2024  ** All calculations should be rechecked by clinician prior to use **    RESULT SUMMARY:  58.2 points  The higher the score (maximum 58.2), the higher the functional status.    9.89 METs        INPUTS:  Take care of self —> 2.75 = Yes  Walk indoors —> 1.75 = Yes  Walk 1&ndash;2 blocks on level ground —> 2.75 = Yes  Climb a flight of stairs or walk up a hill —> 5.5 = Yes  Run a short distance —> 8 = Yes  Do light work around the house —> 2.7 = Yes  Do moderate work around the house —> 3.5 = Yes  Do heavy work around the house —> 8 = Yes  Do yardwork —> 4.5 = Yes  Have sexual relations —> 5.25 = Yes  Participate in moderate recreational activities —> 6 = Yes  Participate in strenuous sports —> 7.5 = Yes    Revised Cardiac Risk Index for Pre-Operative Risk from [address]  on 8/23/2024  ** All calculations should be rechecked by clinician prior to use **    RESULT SUMMARY:  6 points  Class IV Risk    15.0 %  30-day risk of death, MI, or cardiac arrest<br><br>From [place] 2017. These numbers are higher than those from the original study (Rafa 1999). See Evidence for details.      INPUTS:  Elevated-risk surgery —> 1 = Yes  History of ischemic heart disease —> 1 = Yes  History of congestive heart failure —> 1 = Yes  History of cerebrovascular disease —> 1 = Yes  Pre-operative treatment with insulin —> 1 = Yes  Pre-operative creatinine >2 mg/dL / 176.8 µmol/L —> 1 = Yes    Patient/guardian states that this is their complete medical history and list of medications.  Patient/guardian understands instructions given during this visit and was given the opportunity to ask questions and have them answered. They were instructed to follow up with their surgeon/surgeon's office with any questions regarding their procedure.   44y Male presents today for presurgical testing for CYSTOSCOPY, RIGHT URETEROSCOPY LASER LITHOTRIPSY URETERAL STENT EXCHANGE. Per Uro note dated 7/18/24 "43-year-old male who presented with flank pain, VON, and large obstructing right ureteral stones, sp cystoscopy right ureteral stent insertion (07/14/2024).  ?  During vacation in [place], pt experienced flank pain and called off vacation to return back to the [facility]. Pt reports relief after stent placement. His creatinine improved after discharge. Denies flank pain, gross hematuria, dysuria or associated symptoms.  ?  CT AP w/ IV Cont 07/14/2024 - images independently reviewed by me - I agree with the findings. Pt has 3 stones in the ureter. No stones in the left kidney.  IMPRESSION: A series of 3 obstructing calculi within the right ureter  First calculus within the right mid ureter measuring 1.4 x 0.7 x 0.8 cm  Second calculus within the distal right ureter measuring 0.3 x 0.2 x 0.2 cm  Third calculus at the right UVJ measuring 0.5 x 0.4 x 0.3 cm.  ?  Labs 07/13/2024  Cr 1.5  GFR 60  Ca 9.5  K 4.7  UA - negative  ?   history: Denies previous kidney stones, recurrent UTIs. No instrumentation.  Family History: denies  malignancies.  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  43-year-old male who presented with flank pain, VON, and large obstructing right ureteral stones, sp cystoscopy right ureteral stent insertion (07/14/2024).  ?  - plan for right ureteroscopy laser lithotripsy ureteral stent insertion."  Patient states above is true and accurate. He presently denies pain and offers no other complaints at this time. He reports feeling well overall. Now for scheduled procedure.  ?  Patient/guardian denies any CP, palpitations, SOB, cough, or dysuria. No recent URI or UTI.  Stated exercise tolerance is FOS 4-5  ARTEMIO screen reviewed    Patient/guardian denies any recent personal exposure to COVID19. Denies any sick contacts. Patient/guardian reports travel to [place] within the past 30 days. Patient was instructed to quarantine until after procedure.    Anesthesia Alert  YES--Difficult Airway - Class IV   NO--History of neck surgery or radiation  NO--Limited ROM of neck  NO--History of Malignant hyperthermia  NO--Personal or family history of Pseudocholinesterase deficiency.  NO--Prior Anesthesia Complication  NO--Latex Allergy  NO--Loose teeth  NO--History of Rheumatoid Arthritis  NO--ARTEMIO  NO--Bleeding risk  NO--Other_____    Duke Activity Status Index (DASI) from [employer]  on 8/23/2024  ** All calculations should be rechecked by clinician prior to use **    RESULT SUMMARY:  58.2 points  The higher the score (maximum 58.2), the higher the functional status.    9.89 METs        INPUTS:  Take care of self —> 2.75 = Yes  Walk indoors —> 1.75 = Yes  Walk 1&ndash;2 blocks on level ground —> 2.75 = Yes  Climb a flight of stairs or walk up a hill —> 5.5 = Yes  Run a short distance —> 8 = Yes  Do light work around the house —> 2.7 = Yes  Do moderate work around the house —> 3.5 = Yes  Do heavy work around the house —> 8 = Yes  Do yardwork —> 4.5 = Yes  Have sexual relations —> 5.25 = Yes  Participate in moderate recreational activities —> 6 = Yes  Participate in strenuous sports —> 7.5 = Yes    Revised Cardiac Risk Index for Pre-Operative Risk from [employer]  on 8/23/2024  ** All calculations should be rechecked by clinician prior to use **    RESULT SUMMARY:  6 points  Class IV Risk    15.0 %  30-day risk of death, MI, or cardiac arrest<br><br>From [place] 2017. These numbers are higher than those from the original study (Rafa 1999). See Evidence for details.      INPUTS:  Elevated-risk surgery —> 1 = Yes  History of ischemic heart disease —> 1 = Yes  History of congestive heart failure —> 1 = Yes  History of cerebrovascular disease —> 1 = Yes  Pre-operative treatment with insulin —> 1 = Yes  Pre-operative creatinine >2 mg/dL / 176.8 µmol/L —> 1 = Yes    Patient/guardian states that this is their complete medical history and list of medications.  Patient/guardian understands instructions given during this visit and was given the opportunity to ask questions and have them answered. They were instructed to follow up with their surgeon/surgeon's office with any questions regarding their procedure.   44y Male presents today for presurgical testing for CYSTOSCOPY, RIGHT URETEROSCOPY LASER LITHOTRIPSY URETERAL STENT EXCHANGE. Per Uro note dated 7/18/24 "43-year-old male who presented with flank pain, VON, and large obstructing right ureteral stones, sp cystoscopy right ureteral stent insertion (07/14/2024).  ?  During vacation in [place], pt experienced flank pain and called off vacation to return back to the [facility]. Pt reports relief after stent placement. His creatinine improved after discharge. Denies flank pain, gross hematuria, dysuria or associated symptoms.  ?  CT AP w/ IV Cont 07/14/2024 - images independently reviewed by me - I agree with the findings. Pt has 3 stones in the ureter. No stones in the left kidney.  IMPRESSION: A series of 3 obstructing calculi within the right ureter  First calculus within the right mid ureter measuring 1.4 x 0.7 x 0.8 cm  Second calculus within the distal right ureter measuring 0.3 x 0.2 x 0.2 cm  Third calculus at the right UVJ measuring 0.5 x 0.4 x 0.3 cm.  ?  Labs 07/13/2024  Cr 1.5  GFR 60  Ca 9.5  K 4.7  UA - negative  ?   history: Denies previous kidney stones, recurrent UTIs. No instrumentation.  Family History: denies  malignancies.  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  43-year-old male who presented with flank pain, VON, and large obstructing right ureteral stones, sp cystoscopy right ureteral stent insertion (07/14/2024).  ?  - plan for right ureteroscopy laser lithotripsy ureteral stent insertion."  Patient states above is true and accurate. He presently denies pain and offers no other complaints at this time. He reports feeling well overall. Now for scheduled procedure.  ?  Patient/guardian denies any CP, palpitations, SOB, cough, or dysuria. No recent URI or UTI.  Stated exercise tolerance is FOS 4-5  ARTEMIO screen reviewed    Patient/guardian denies any recent personal exposure to COVID19. Denies any sick contacts. Patient/guardian reports travel to [place] within the past 30 days. Patient was instructed to quarantine until after procedure.    Anesthesia Alert  YES--Difficult Airway - Class IV   NO--History of neck surgery or radiation  NO--Limited ROM of neck  NO--History of Malignant hyperthermia  NO--Personal or family history of Pseudocholinesterase deficiency.  NO--Prior Anesthesia Complication  NO--Latex Allergy  NO--Loose teeth  NO--History of Rheumatoid Arthritis  NO--ARTEMIO  NO--Bleeding risk  NO--Other_____    Duke Activity Status Index (DASI) from [employer]  on 8/23/2024  ** All calculations should be rechecked by clinician prior to use **    RESULT SUMMARY:  58.2 points  The higher the score (maximum 58.2), the higher the functional status.    9.89 METs        INPUTS:  Take care of self —> 2.75 = Yes  Walk indoors —> 1.75 = Yes  Walk 1&ndash;2 blocks on level ground —> 2.75 = Yes  Climb a flight of stairs or walk up a hill —> 5.5 = Yes  Run a short distance —> 8 = Yes  Do light work around the house —> 2.7 = Yes  Do moderate work around the house —> 3.5 = Yes  Do heavy work around the house —> 8 = Yes  Do yardwork —> 4.5 = Yes  Have sexual relations —> 5.25 = Yes  Participate in moderate recreational activities —> 6 = Yes  Participate in strenuous sports —> 7.5 = Yes    Revised Cardiac Risk Index for Pre-Operative Risk from [employer]  on 8/23/2024  ** All calculations should be rechecked by clinician prior to use **    RESULT SUMMARY:  0 points  Class I Risk    3.9 %  30-day risk of death, MI, or cardiac arrest<br><br>From [place] 2017. These numbers are higher than those from the original study (Rafa 1999). See Evidence for details.      INPUTS:  Elevated-risk surgery —> 0 = No  History of ischemic heart disease —> 0 = No  History of congestive heart failure —> 0 = No  History of cerebrovascular disease —> 0 = No  Pre-operative treatment with insulin —> 0 = No  Pre-operative creatinine >2 mg/dL / 176.8 µmol/L —> 0 = No      Patient/guardian states that this is their complete medical history and list of medications.  Patient/guardian understands instructions given during this visit and was given the opportunity to ask questions and have them answered. They were instructed to follow up with their surgeon/surgeon's office with any questions regarding their procedure.

## 2024-08-24 DIAGNOSIS — Z01.818 ENCOUNTER FOR OTHER PREPROCEDURAL EXAMINATION: ICD-10-CM

## 2024-08-24 DIAGNOSIS — N20.2 CALCULUS OF KIDNEY WITH CALCULUS OF URETER: ICD-10-CM

## 2024-08-25 LAB
CULTURE RESULTS: SIGNIFICANT CHANGE UP
SPECIMEN SOURCE: SIGNIFICANT CHANGE UP

## 2024-09-06 ENCOUNTER — TRANSCRIPTION ENCOUNTER (OUTPATIENT)
Age: 44
End: 2024-09-06

## 2024-09-06 ENCOUNTER — OUTPATIENT (OUTPATIENT)
Dept: OUTPATIENT SERVICES | Facility: HOSPITAL | Age: 44
LOS: 1 days | Discharge: HOME | End: 2024-09-06
Payer: COMMERCIAL

## 2024-09-06 ENCOUNTER — APPOINTMENT (OUTPATIENT)
Dept: UROLOGY | Facility: HOSPITAL | Age: 44
End: 2024-09-06

## 2024-09-06 VITALS
TEMPERATURE: 98 F | SYSTOLIC BLOOD PRESSURE: 151 MMHG | DIASTOLIC BLOOD PRESSURE: 94 MMHG | OXYGEN SATURATION: 99 % | WEIGHT: 210.1 LBS | RESPIRATION RATE: 18 BRPM | HEART RATE: 72 BPM | HEIGHT: 67 IN

## 2024-09-06 VITALS — HEART RATE: 57 BPM | RESPIRATION RATE: 18 BRPM

## 2024-09-06 DIAGNOSIS — N20.2 CALCULUS OF KIDNEY WITH CALCULUS OF URETER: ICD-10-CM

## 2024-09-06 DIAGNOSIS — Z87.2 PERSONAL HISTORY OF DISEASES OF THE SKIN AND SUBCUTANEOUS TISSUE: Chronic | ICD-10-CM

## 2024-09-06 PROCEDURE — C1769: CPT

## 2024-09-06 PROCEDURE — C2617: CPT

## 2024-09-06 PROCEDURE — 72170 X-RAY EXAM OF PELVIS: CPT

## 2024-09-06 PROCEDURE — 52356 CYSTO/URETERO W/LITHOTRIPSY: CPT | Mod: RT

## 2024-09-06 PROCEDURE — C1758: CPT

## 2024-09-06 PROCEDURE — 82365 CALCULUS SPECTROSCOPY: CPT

## 2024-09-06 PROCEDURE — C1889: CPT

## 2024-09-06 RX ORDER — ONDANSETRON 2 MG/ML
4 INJECTION, SOLUTION INTRAMUSCULAR; INTRAVENOUS ONCE
Refills: 0 | Status: DISCONTINUED | OUTPATIENT
Start: 2024-09-06 | End: 2024-09-06

## 2024-09-06 RX ORDER — HYDROMORPHONE HYDROCHLORIDE 2 MG/1
0.5 TABLET ORAL
Refills: 0 | Status: DISCONTINUED | OUTPATIENT
Start: 2024-09-06 | End: 2024-09-06

## 2024-09-06 RX ORDER — SULFAMETHOXAZOLE AND TRIMETHOPRIM 800; 160 MG/1; MG/1
1 TABLET ORAL
Qty: 4 | Refills: 0
Start: 2024-09-06 | End: 2024-09-07

## 2024-09-06 RX ORDER — ACETAMINOPHEN 325 MG/1
650 TABLET ORAL ONCE
Refills: 0 | Status: DISCONTINUED | OUTPATIENT
Start: 2024-09-06 | End: 2024-09-06

## 2024-09-06 NOTE — ASU PATIENT PROFILE, ADULT - ANESTHESIA, PREVIOUS REACTION, PROFILE
After Visit Summary   11/16/2022    Jose Fajardo   MRN: [id number]           Visit Information     Date & Time  11/16/2022  8:00 AM Provider  [address] [facility] Phone  [phone] Were  Most recent update: 11/16/2022  8:05 AM    BP   132/92      Wt   210 lb    LMP   11/16/2022           Allergies as of 11/16/2022  Review status set to Review Complete on 11/16/2022       Noted Reaction Type Reactions    Corylus 04/05/2022    HIVES      Your Current Medications        Dosage    methylphenidate ER 54 MG Oral Tab CR     nitrofurantoin monohydrate macro (MACROBID) 100 MG Oral Cap Take 1 capsule (100 mg total) by mouth 2 (two) times daily for 5 days. Diagnoses for This Visit    Dysuria   [788. 1. ICD-9-CM]  -  Primary  Screening for HPV (human papillomavirus)   [106400]    Urinary frequency   [788.41. ICD-9-CM]             Follow-up    Return in about 2 days (around 11/18/2022), or if symptoms worsen or fail to improve. We Ordered the Following     Normal Orders This Visit    HPV HIGH RISK , THIN PREP COLLECTION [[id number] CUSTOM]     URINALYSIS [address] W/O SCOPE [22507 CPT(R)]     URINALYSIS WITH CULTURE REFLEX [[id number] CUSTOM]     URINE CULTURE, ROUTINE [[id number] CUSTOM]     Future Labs/Procedures Expected by Expires    HPV HIGH RISK , THIN PREP COLLECTION [[id number] CUSTOM]  11/16/2022 11/16/2023    URINALYSIS WITH CULTURE REFLEX [[id number] CUSTOM]  11/16/2022 11/16/2023      Follow-up Instructions    Return in about 2 days (around 11/18/2022), or if symptoms worsen or fail to improve. Did you know that [facility] primary care physicians now offer Video Visits through [address] for adult patients for a variety of conditions such as allergies, back pain and cold symptoms? Skip the drive and waiting room and online chat with a doctor face-to-face using your web-cam enabled computer or mobile device wherever you are.  Video Visits cost $50 and can be paid hassle-free using a credit, debit, or health savings card. Not active on [employer]? Ask us how to get signed up today! If you receive a survey from [facility], please take a few minutes to complete it and provide feedback. We strive to deliver the best patient experience and are looking for ways to make improvements. Your feedback will help us do so. For more information on [employer], please visit [email or web address]           No text in SmartText           No text in SmartText none

## 2024-09-06 NOTE — ASU DISCHARGE PLAN (ADULT/PEDIATRIC) - NS MD DC FALL RISK RISK
For information on Fall & Injury Prevention, visit: [email or web address] OR  [email or web address] OR  https://www.cdc.gov/steadi/patient.html

## 2024-09-09 ENCOUNTER — NON-APPOINTMENT (OUTPATIENT)
Age: 44
End: 2024-09-09

## 2024-09-10 ENCOUNTER — APPOINTMENT (OUTPATIENT)
Dept: UROLOGY | Facility: CLINIC | Age: 44
End: 2024-09-10
Payer: COMMERCIAL

## 2024-09-10 DIAGNOSIS — N20.1 CALCULUS OF URETER: ICD-10-CM

## 2024-09-10 DIAGNOSIS — F17.210 NICOTINE DEPENDENCE, CIGARETTES, UNCOMPLICATED: ICD-10-CM

## 2024-09-10 DIAGNOSIS — E66.9 OBESITY, UNSPECIFIED: ICD-10-CM

## 2024-09-10 DIAGNOSIS — N13.30 UNSPECIFIED HYDRONEPHROSIS: ICD-10-CM

## 2024-09-10 DIAGNOSIS — N17.9 ACUTE KIDNEY FAILURE, UNSPECIFIED: ICD-10-CM

## 2024-09-10 PROCEDURE — 52315 CYSTOSCOPY AND TREATMENT: CPT

## 2024-09-10 PROCEDURE — 99214 OFFICE O/P EST MOD 30 MIN: CPT | Mod: 25

## 2024-09-10 NOTE — HISTORY OF PRESENT ILLNESS
[FreeTextEntry1] : JERROD STEVENS is a 43-year-old male who presented with flank pain, VON, and large obstructing right ureteral stones, sp cystoscopy right ureteral stent insertion (07/14/2024). sp right ureteroscopy laser lithotripsy ureteral stent exchange, uric acid appearing stones. kidney inspected = randals plaques (09/06/2024).  Pt presents today for cystoscopy stent removal. Denies flank pain, gross hematuria, dysuria or associated symptoms.   Fluoroscopy images reviewed demonstrating no stone seen stent in position  previously CT AP w/ IV Cont 07/14/2024  - I agree with the findings. Pt has 3 stones in the ureter. No stones in the left kidney.  IMPRESSION: A series of 3 obstructing calculi within the right ureter First calculus within the right mid ureter measuring 1.4 x 0.7 x 0.8 cm Second calculus within the distal right ureter measuring 0.3 x 0.2 x 0.2 cm Third calculus at the right UVJ measuring 0.5 x 0.4 x 0.3 cm.  Labs 07/13/2024 Cr 1.5 GFR 60 Ca 9.5 K 4.7  UA - negative    history: Denies previous kidney stones, recurrent UTIs. No instrumentation. Family History: denies  malignancies. 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

## 2024-09-10 NOTE — ASSESSMENT
[FreeTextEntry1] : JERROD STEVENS is a 43-year-old male who presented with flank pain, VON, and large obstructing right ureteral stones, sp cystoscopy right ureteral stent insertion (07/14/2024). sp right ureteroscopy laser lithotripsy ureteral stent exchange, uric acid appearing stones. kidney inspected = randals plaques (09/06/2024).  Stent removed in one piece.  Today we focused on stone dietary prevention. Appears patient has a carnivore diet.  He will try moderation and increase fruits and veggies instead  Plan - f/u 3 months. Litholink, RBUS and KUB prior to reassess hydro cont fu w PCP for reassessment of renal function Follow-up stone analysis.  Consider potassium citrate in the future versus stone dietary prevention most likely bladder as this is patient's first episode   Stone nutritional counseling given--First and foremost, the most important recommendation is to increase water intake. I have recommended that he drink enough water to produce 2.5L of urine per day. More easily put, I have recommended the patient consume enough water to keep His urine clear. I have also recommended other dietary sources of citrate such as lemon which prevents stone formation. I have also stressed the importance of minimizing salt and animal protein in the diet.

## 2024-09-10 NOTE — ADDENDUM
[FreeTextEntry1] : Patient's note was transcribed with the assistance of a medical scribe under the supervision of Dr. Alaniz. I, Dr. Alaniz, have reviewed the patient's chart and agree that it aligns with my medical decisions. Bony Gale, our scribe, also served as a chaperone for physical examination purposes.

## 2024-09-16 LAB
CELL MATERIAL STONE EST-MCNT: SIGNIFICANT CHANGE UP
LABORATORY COMMENT REPORT: SIGNIFICANT CHANGE UP
NIDUS STONE QN: SIGNIFICANT CHANGE UP

## 2024-12-10 ENCOUNTER — APPOINTMENT (OUTPATIENT)
Dept: UROLOGY | Facility: CLINIC | Age: 44
End: 2024-12-10
Payer: COMMERCIAL

## 2024-12-10 VITALS
RESPIRATION RATE: 18 BRPM | OXYGEN SATURATION: 97 % | WEIGHT: 210 LBS | BODY MASS INDEX: 32.96 KG/M2 | DIASTOLIC BLOOD PRESSURE: 105 MMHG | HEIGHT: 67 IN | SYSTOLIC BLOOD PRESSURE: 155 MMHG | HEART RATE: 70 BPM

## 2024-12-10 DIAGNOSIS — N20.1 CALCULUS OF URETER: ICD-10-CM

## 2024-12-10 LAB
BILIRUB UR QL STRIP: NORMAL
COLLECTION METHOD: NORMAL
GLUCOSE UR-MCNC: NORMAL
HCG UR QL: 0.2 EU/DL
HGB UR QL STRIP.AUTO: NORMAL
KETONES UR-MCNC: NORMAL
LEUKOCYTE ESTERASE UR QL STRIP: NORMAL
NITRITE UR QL STRIP: NORMAL
PH UR STRIP: 5.5
PROT UR STRIP-MCNC: NORMAL
SP GR UR STRIP: 1.01

## 2024-12-10 PROCEDURE — 99213 OFFICE O/P EST LOW 20 MIN: CPT

## 2024-12-10 PROCEDURE — 81003 URINALYSIS AUTO W/O SCOPE: CPT | Mod: QW

## 2024-12-10 PROCEDURE — G2211 COMPLEX E/M VISIT ADD ON: CPT | Mod: NC

## 2025-01-14 ENCOUNTER — RESULT CHARGE (OUTPATIENT)
Age: 45
End: 2025-01-14

## 2025-01-14 ENCOUNTER — APPOINTMENT (OUTPATIENT)
Dept: UROLOGY | Facility: CLINIC | Age: 45
End: 2025-01-14
Payer: COMMERCIAL

## 2025-01-14 VITALS
HEART RATE: 75 BPM | WEIGHT: 210 LBS | RESPIRATION RATE: 18 BRPM | OXYGEN SATURATION: 96 % | BODY MASS INDEX: 32.96 KG/M2 | HEIGHT: 67 IN | SYSTOLIC BLOOD PRESSURE: 154 MMHG | DIASTOLIC BLOOD PRESSURE: 106 MMHG

## 2025-01-14 DIAGNOSIS — N13.30 UNSPECIFIED HYDRONEPHROSIS: ICD-10-CM

## 2025-01-14 DIAGNOSIS — R82.991 HYPOCITRATURIA: ICD-10-CM

## 2025-01-14 DIAGNOSIS — N20.0 CALCULUS OF KIDNEY: ICD-10-CM

## 2025-01-14 PROCEDURE — G2211 COMPLEX E/M VISIT ADD ON: CPT | Mod: NC

## 2025-01-14 PROCEDURE — 99214 OFFICE O/P EST MOD 30 MIN: CPT

## 2025-01-14 RX ORDER — POTASSIUM CITRATE 10 MEQ/1
10 MEQ TABLET, EXTENDED RELEASE ORAL
Qty: 360 | Refills: 3 | Status: ACTIVE | COMMUNITY
Start: 2025-01-14 | End: 1900-01-01

## 2025-01-17 LAB
BILIRUB UR QL STRIP: NORMAL
CLARITY UR: CLEAR
COLLECTION METHOD: NORMAL
GLUCOSE UR-MCNC: NORMAL
HCG UR QL: 0.2 EU/DL
HGB UR QL STRIP.AUTO: NORMAL
KETONES UR-MCNC: NORMAL
LEUKOCYTE ESTERASE UR QL STRIP: NORMAL
NITRITE UR QL STRIP: NORMAL
PH UR STRIP: 5.5
PROT UR STRIP-MCNC: NORMAL
SP GR UR STRIP: 1.02

## 2025-04-03 ENCOUNTER — APPOINTMENT (OUTPATIENT)
Dept: UROLOGY | Facility: CLINIC | Age: 45
End: 2025-04-03

## 2025-07-02 ENCOUNTER — EMERGENCY (EMERGENCY)
Facility: HOSPITAL | Age: 45
LOS: 0 days | Discharge: HOME | End: 2025-07-02
Attending: STUDENT IN AN ORGANIZED HEALTH CARE EDUCATION/TRAINING PROGRAM
Payer: COMMERCIAL

## 2025-07-02 ENCOUNTER — NON-APPOINTMENT (OUTPATIENT)
Age: 45
End: 2025-07-02

## 2025-07-02 VITALS
TEMPERATURE: 98 F | WEIGHT: 210.1 LBS | HEIGHT: 67 IN | DIASTOLIC BLOOD PRESSURE: 88 MMHG | OXYGEN SATURATION: 97 % | RESPIRATION RATE: 20 BRPM | SYSTOLIC BLOOD PRESSURE: 157 MMHG | HEART RATE: 90 BPM

## 2025-07-02 DIAGNOSIS — E78.5 HYPERLIPIDEMIA, UNSPECIFIED: ICD-10-CM

## 2025-07-02 DIAGNOSIS — Z87.442 PERSONAL HISTORY OF URINARY CALCULI: ICD-10-CM

## 2025-07-02 DIAGNOSIS — Z87.2 PERSONAL HISTORY OF DISEASES OF THE SKIN AND SUBCUTANEOUS TISSUE: Chronic | ICD-10-CM

## 2025-07-02 DIAGNOSIS — R10.816 EPIGASTRIC ABDOMINAL TENDERNESS: ICD-10-CM

## 2025-07-02 DIAGNOSIS — R10.811 RIGHT UPPER QUADRANT ABDOMINAL TENDERNESS: ICD-10-CM

## 2025-07-02 LAB
ALBUMIN SERPL ELPH-MCNC: 4.3 G/DL — SIGNIFICANT CHANGE UP (ref 3.5–5.2)
ALP SERPL-CCNC: 90 U/L — SIGNIFICANT CHANGE UP (ref 30–115)
ALT FLD-CCNC: 47 U/L — HIGH (ref 0–41)
ANION GAP SERPL CALC-SCNC: 10 MMOL/L — SIGNIFICANT CHANGE UP (ref 7–14)
APPEARANCE UR: CLEAR — SIGNIFICANT CHANGE UP
AST SERPL-CCNC: 25 U/L — SIGNIFICANT CHANGE UP (ref 0–41)
BACTERIA # UR AUTO: NEGATIVE /HPF — SIGNIFICANT CHANGE UP
BASOPHILS # BLD AUTO: 0.04 K/UL — SIGNIFICANT CHANGE UP (ref 0–0.2)
BASOPHILS NFR BLD AUTO: 0.4 % — SIGNIFICANT CHANGE UP (ref 0–1)
BILIRUB DIRECT SERPL-MCNC: <0.2 MG/DL — SIGNIFICANT CHANGE UP (ref 0–0.3)
BILIRUB INDIRECT FLD-MCNC: >0.1 MG/DL — LOW (ref 0.2–1.2)
BILIRUB SERPL-MCNC: 0.3 MG/DL — SIGNIFICANT CHANGE UP (ref 0.2–1.2)
BILIRUB UR-MCNC: NEGATIVE — SIGNIFICANT CHANGE UP
BUN SERPL-MCNC: 13 MG/DL — SIGNIFICANT CHANGE UP (ref 10–20)
CALCIUM SERPL-MCNC: 8.9 MG/DL — SIGNIFICANT CHANGE UP (ref 8.4–10.5)
CAST: 4 /LPF — SIGNIFICANT CHANGE UP (ref 0–4)
CHLORIDE SERPL-SCNC: 105 MMOL/L — SIGNIFICANT CHANGE UP (ref 98–110)
CO2 SERPL-SCNC: 25 MMOL/L — SIGNIFICANT CHANGE UP (ref 17–32)
COLOR SPEC: YELLOW — SIGNIFICANT CHANGE UP
CREAT SERPL-MCNC: 1.1 MG/DL — SIGNIFICANT CHANGE UP (ref 0.7–1.5)
DIFF PNL FLD: ABNORMAL
EGFR: 86 ML/MIN/1.73M2 — SIGNIFICANT CHANGE UP
EGFR: 86 ML/MIN/1.73M2 — SIGNIFICANT CHANGE UP
EOSINOPHIL # BLD AUTO: 0.21 K/UL — SIGNIFICANT CHANGE UP (ref 0–0.7)
EOSINOPHIL NFR BLD AUTO: 2 % — SIGNIFICANT CHANGE UP (ref 0–8)
GLUCOSE SERPL-MCNC: 187 MG/DL — HIGH (ref 70–99)
GLUCOSE UR QL: 250 MG/DL
HCT VFR BLD CALC: 43 % — SIGNIFICANT CHANGE UP (ref 42–52)
HGB BLD-MCNC: 14.8 G/DL — SIGNIFICANT CHANGE UP (ref 14–18)
IMM GRANULOCYTES NFR BLD AUTO: 0.3 % — SIGNIFICANT CHANGE UP (ref 0.1–0.3)
KETONES UR QL: ABNORMAL MG/DL
LACTATE SERPL-SCNC: 1.5 MMOL/L — SIGNIFICANT CHANGE UP (ref 0.7–2)
LEUKOCYTE ESTERASE UR-ACNC: NEGATIVE — SIGNIFICANT CHANGE UP
LIDOCAIN IGE QN: 59 U/L — SIGNIFICANT CHANGE UP (ref 7–60)
LYMPHOCYTES # BLD AUTO: 2.56 K/UL — SIGNIFICANT CHANGE UP (ref 1.2–3.4)
LYMPHOCYTES # BLD AUTO: 24.9 % — SIGNIFICANT CHANGE UP (ref 20.5–51.1)
MCHC RBC-ENTMCNC: 30.6 PG — SIGNIFICANT CHANGE UP (ref 27–31)
MCHC RBC-ENTMCNC: 34.4 G/DL — SIGNIFICANT CHANGE UP (ref 32–37)
MCV RBC AUTO: 88.8 FL — SIGNIFICANT CHANGE UP (ref 80–94)
MONOCYTES # BLD AUTO: 0.59 K/UL — SIGNIFICANT CHANGE UP (ref 0.1–0.6)
MONOCYTES NFR BLD AUTO: 5.7 % — SIGNIFICANT CHANGE UP (ref 1.7–9.3)
NEUTROPHILS # BLD AUTO: 6.86 K/UL — HIGH (ref 1.4–6.5)
NEUTROPHILS NFR BLD AUTO: 66.7 % — SIGNIFICANT CHANGE UP (ref 42.2–75.2)
NITRITE UR-MCNC: NEGATIVE — SIGNIFICANT CHANGE UP
NRBC BLD AUTO-RTO: 0 /100 WBCS — SIGNIFICANT CHANGE UP (ref 0–0)
PH UR: 5.5 — SIGNIFICANT CHANGE UP (ref 5–8)
PLATELET # BLD AUTO: 243 K/UL — SIGNIFICANT CHANGE UP (ref 130–400)
PMV BLD: 10 FL — SIGNIFICANT CHANGE UP (ref 7.4–10.4)
POTASSIUM SERPL-MCNC: 3.7 MMOL/L — SIGNIFICANT CHANGE UP (ref 3.5–5)
POTASSIUM SERPL-SCNC: 3.7 MMOL/L — SIGNIFICANT CHANGE UP (ref 3.5–5)
PROT SERPL-MCNC: 6.5 G/DL — SIGNIFICANT CHANGE UP (ref 6–8)
PROT UR-MCNC: SIGNIFICANT CHANGE UP MG/DL
RBC # BLD: 4.84 M/UL — SIGNIFICANT CHANGE UP (ref 4.7–6.1)
RBC # FLD: 11.9 % — SIGNIFICANT CHANGE UP (ref 11.5–14.5)
RBC CASTS # UR COMP ASSIST: 7 /HPF — HIGH (ref 0–4)
SODIUM SERPL-SCNC: 140 MMOL/L — SIGNIFICANT CHANGE UP (ref 135–146)
SP GR SPEC: 1.02 — SIGNIFICANT CHANGE UP (ref 1–1.03)
SQUAMOUS # UR AUTO: 1 /HPF — SIGNIFICANT CHANGE UP (ref 0–5)
TROPONIN T, HIGH SENSITIVITY RESULT: 7 NG/L — SIGNIFICANT CHANGE UP (ref 6–21)
UROBILINOGEN FLD QL: 1 MG/DL — SIGNIFICANT CHANGE UP (ref 0.2–1)
WBC # BLD: 10.29 K/UL — SIGNIFICANT CHANGE UP (ref 4.8–10.8)
WBC # FLD AUTO: 10.29 K/UL — SIGNIFICANT CHANGE UP (ref 4.8–10.8)
WBC UR QL: 1 /HPF — SIGNIFICANT CHANGE UP (ref 0–5)

## 2025-07-02 PROCEDURE — 81001 URINALYSIS AUTO W/SCOPE: CPT

## 2025-07-02 PROCEDURE — 99285 EMERGENCY DEPT VISIT HI MDM: CPT | Mod: 25

## 2025-07-02 PROCEDURE — 76705 ECHO EXAM OF ABDOMEN: CPT | Mod: 26

## 2025-07-02 PROCEDURE — 93005 ELECTROCARDIOGRAM TRACING: CPT

## 2025-07-02 PROCEDURE — 74177 CT ABD & PELVIS W/CONTRAST: CPT | Mod: 26

## 2025-07-02 PROCEDURE — 80048 BASIC METABOLIC PNL TOTAL CA: CPT

## 2025-07-02 PROCEDURE — 93010 ELECTROCARDIOGRAM REPORT: CPT

## 2025-07-02 PROCEDURE — 36415 COLL VENOUS BLD VENIPUNCTURE: CPT

## 2025-07-02 PROCEDURE — 71045 X-RAY EXAM CHEST 1 VIEW: CPT

## 2025-07-02 PROCEDURE — 80076 HEPATIC FUNCTION PANEL: CPT

## 2025-07-02 PROCEDURE — 83605 ASSAY OF LACTIC ACID: CPT

## 2025-07-02 PROCEDURE — 74177 CT ABD & PELVIS W/CONTRAST: CPT

## 2025-07-02 PROCEDURE — 71045 X-RAY EXAM CHEST 1 VIEW: CPT | Mod: 26

## 2025-07-02 PROCEDURE — 71250 CT THORAX DX C-: CPT | Mod: 26

## 2025-07-02 PROCEDURE — 99285 EMERGENCY DEPT VISIT HI MDM: CPT

## 2025-07-02 PROCEDURE — 83690 ASSAY OF LIPASE: CPT

## 2025-07-02 PROCEDURE — 85025 COMPLETE CBC W/AUTO DIFF WBC: CPT

## 2025-07-02 PROCEDURE — 36000 PLACE NEEDLE IN VEIN: CPT

## 2025-07-02 PROCEDURE — 71250 CT THORAX DX C-: CPT

## 2025-07-02 PROCEDURE — 84484 ASSAY OF TROPONIN QUANT: CPT

## 2025-07-02 PROCEDURE — 94640 AIRWAY INHALATION TREATMENT: CPT

## 2025-07-02 PROCEDURE — 76705 ECHO EXAM OF ABDOMEN: CPT

## 2025-07-02 RX ORDER — SODIUM CHLORIDE 9 G/1000ML
1000 INJECTION, SOLUTION INTRAVENOUS ONCE
Refills: 0 | Status: COMPLETED | OUTPATIENT
Start: 2025-07-02 | End: 2025-07-02

## 2025-07-02 RX ORDER — ACETAMINOPHEN 500 MG/5ML
650 LIQUID (ML) ORAL ONCE
Refills: 0 | Status: COMPLETED | OUTPATIENT
Start: 2025-07-02 | End: 2025-07-02

## 2025-07-02 RX ORDER — MAGNESIUM, ALUMINUM HYDROXIDE 200-200 MG
30 TABLET,CHEWABLE ORAL ONCE
Refills: 0 | Status: COMPLETED | OUTPATIENT
Start: 2025-07-02 | End: 2025-07-02

## 2025-07-02 RX ORDER — ALBUTEROL SULFATE 2.5 MG/3ML
2 VIAL, NEBULIZER (ML) INHALATION EVERY 6 HOURS
Refills: 0 | Status: DISCONTINUED | OUTPATIENT
Start: 2025-07-02 | End: 2025-07-02

## 2025-07-02 RX ADMIN — Medication 30 MILLILITER(S): at 18:46

## 2025-07-02 RX ADMIN — Medication 20 MILLIGRAM(S): at 18:46

## 2025-07-02 RX ADMIN — SODIUM CHLORIDE 1000 MILLILITER(S): 9 INJECTION, SOLUTION INTRAVENOUS at 18:46

## 2025-07-02 RX ADMIN — Medication 2 PUFF(S): at 18:46

## 2025-07-02 RX ADMIN — Medication 650 MILLIGRAM(S): at 18:46

## 2025-07-02 NOTE — ED PROVIDER NOTE - CARE PROVIDER_API CALL
Kelley Hammer  Internal Medicine  [address] [place] [address]  Phone: [phone]  Fax: [phone]  Follow Up Time:

## 2025-07-02 NOTE — ED PROVIDER NOTE - CLINICAL SUMMARY MEDICAL DECISION MAKING FREE TEXT BOX
44-year-old male with history of hyperlipidemia, kidney stones presenting today with epigastric abdominal pain for the last 3 days.  Denies fevers vomiting or diarrhea.  Denies exertional symptoms.  Denies family history of CAD.  Patient went to urgent care where he had an ekg independently interpreted by me, Dr. Dunlap, normal sinus rhythm with T wave inversions in inferior leads which he has had in the past. epigastric tenderness. ekg independently interpreted by me Dr. Dunlap showing NSR, chronic t wave inversions in inferior leads. labs unremarkable, trop 7 although clinical suspicion for ACS very low. chest xray independently interpreted by me Dr. Dunlap showing no focal opacities. CT imaging with no acute pathology- noted with bladder thickening however no UTI on UA. also noted with hyperglycemia, patient is not a known diabetic. findings discussed with patient with need to follow up with his PCP for further evaluation. patient's current symptoms most likely gastritis, vs PUD- denies melena or BRBPR. will empirically treat with pepcid, diet precautions. follow up with GI.

## 2025-07-02 NOTE — ED PROVIDER NOTE - PATIENT PORTAL LINK FT
You can access the FollowMyHealth Patient Portal offered by [facility] by registering at the following website: [email or web address]. By joining [employer]’s FollowMyHealth portal, you will also be able to view your health information using other applications (apps) compatible with our system.

## 2025-07-02 NOTE — ED PROVIDER NOTE - NSFOLLOWUPINSTRUCTIONS_ED_ALL_ED_FT
YOU ALSO HAD ELEVATED GLUCOSE IN YOUR BLOOD AS WELL AS YOUR URINE- PLEASE FOLLOW UP WITH YOUR PCP FOR FURTHER WORK UP.     REFERRAL FOR GASTROENTEROLOGY:    Our Emergency Department Referral Coordinators will be reaching out to you in the next 24-48 hours from 9:00am to 5:00pm to schedule a follow up appointment. Please expect a phone call from the hospital in that time frame. If you do not receive a call or if you have any questions or concerns, you can reach them at   [phone].      Abdominal Pain    Many things can cause abdominal pain. Many times, abdominal pain is not caused by a disease and will improve without treatment. Your health care provider will do a physical exam to determine if there is a dangerous cause of your pain; blood tests and imaging may help determine the cause of your pain. However, in many cases, no cause may be found and you may need further testing as an outpatient. Monitor your abdominal pain for any changes.     SEEK IMMEDIATE MEDICAL CARE IF YOU HAVE ANY OF THE FOLLOWING SYMPTOMS: worsening abdominal pain, uncontrollable vomiting, profuse diarrhea, inability to have bowel movements or pass gas, black or bloody stools, fever accompanying chest pain or back pain, or fainting. These symptoms may represent a serious problem that is an emergency. Do not wait to see if the symptoms will go away. Get medical help right away. Call 911 and do not drive yourself to the hospital.

## 2025-07-02 NOTE — ED PROVIDER NOTE - OBJECTIVE STATEMENT
44-year-old male with history of hyperlipidemia, kidney stones presenting today with epigastric abdominal pain for the last 3 days.  Denies fevers vomiting or diarrhea.  Denies exertional symptoms.  Denies family history of CAD.  Patient went to urgent care where he had an ekg independently interpreted by me, Dr. Dunlap, normal sinus rhythm with T wave inversions in inferior leads which he has had in the past.

## 2025-07-07 NOTE — CHART NOTE - NSCHARTNOTEFT_GEN_A_CORE
[id number] Left [facility]/m, CT 7/3. / Left message 7/7 - KIM    SPECIALTY: gastroenterology

## 2025-07-09 ENCOUNTER — APPOINTMENT (OUTPATIENT)
Dept: ORTHOPEDIC SURGERY | Facility: CLINIC | Age: 45
End: 2025-07-09

## 2025-07-24 ENCOUNTER — NON-APPOINTMENT (OUTPATIENT)
Age: 45
End: 2025-07-24

## 2025-08-11 ENCOUNTER — APPOINTMENT (OUTPATIENT)
Dept: ORTHOPEDIC SURGERY | Facility: CLINIC | Age: 45
End: 2025-08-11